# Patient Record
Sex: MALE | Race: WHITE | NOT HISPANIC OR LATINO | Employment: OTHER | URBAN - NONMETROPOLITAN AREA
[De-identification: names, ages, dates, MRNs, and addresses within clinical notes are randomized per-mention and may not be internally consistent; named-entity substitution may affect disease eponyms.]

---

## 2017-05-26 ENCOUNTER — OFFICE VISIT (OUTPATIENT)
Dept: FAMILY MEDICINE CLINIC | Facility: CLINIC | Age: 37
End: 2017-05-26

## 2017-05-26 VITALS
TEMPERATURE: 98.5 F | DIASTOLIC BLOOD PRESSURE: 85 MMHG | WEIGHT: 188 LBS | HEIGHT: 69 IN | BODY MASS INDEX: 27.85 KG/M2 | OXYGEN SATURATION: 98 % | SYSTOLIC BLOOD PRESSURE: 140 MMHG | HEART RATE: 74 BPM

## 2017-05-26 DIAGNOSIS — H60.312 ACUTE DIFFUSE OTITIS EXTERNA OF LEFT EAR: Primary | ICD-10-CM

## 2017-05-26 PROCEDURE — 99213 OFFICE O/P EST LOW 20 MIN: CPT | Performed by: PHYSICIAN ASSISTANT

## 2017-05-26 RX ORDER — OFLOXACIN 3 MG/ML
10 SOLUTION/ DROPS OPHTHALMIC SEE ADMIN INSTRUCTIONS
Qty: 10 ML | Refills: 0 | Status: SHIPPED | OUTPATIENT
Start: 2017-05-26 | End: 2018-05-17

## 2017-05-26 RX ORDER — AZITHROMYCIN 250 MG/1
TABLET, FILM COATED ORAL
Qty: 6 TABLET | Refills: 0 | Status: SHIPPED | OUTPATIENT
Start: 2017-05-26 | End: 2018-04-09 | Stop reason: SDUPTHER

## 2017-05-30 DIAGNOSIS — R56.9 SEIZURE (HCC): ICD-10-CM

## 2017-06-05 RX ORDER — LOSARTAN POTASSIUM AND HYDROCHLOROTHIAZIDE 25; 100 MG/1; MG/1
1 TABLET ORAL DAILY
Qty: 30 TABLET | Refills: 0
Start: 2017-06-05 | End: 2017-06-30 | Stop reason: SDUPTHER

## 2017-06-30 DIAGNOSIS — R56.9 SEIZURE (HCC): ICD-10-CM

## 2017-07-02 RX ORDER — LOSARTAN POTASSIUM AND HYDROCHLOROTHIAZIDE 25; 100 MG/1; MG/1
TABLET ORAL
Qty: 30 TABLET | Refills: 0
Start: 2017-07-02 | End: 2017-08-31 | Stop reason: SDUPTHER

## 2017-08-31 DIAGNOSIS — R56.9 SEIZURE (HCC): ICD-10-CM

## 2017-08-31 RX ORDER — LOSARTAN POTASSIUM AND HYDROCHLOROTHIAZIDE 25; 100 MG/1; MG/1
TABLET ORAL
Qty: 30 TABLET | Refills: 0 | OUTPATIENT
Start: 2017-08-31

## 2017-09-01 DIAGNOSIS — R56.9 SEIZURE (HCC): ICD-10-CM

## 2017-09-01 RX ORDER — LOSARTAN POTASSIUM AND HYDROCHLOROTHIAZIDE 25; 100 MG/1; MG/1
1 TABLET ORAL DAILY
Qty: 30 TABLET | Refills: 0
Start: 2017-09-01 | End: 2017-09-01 | Stop reason: SDUPTHER

## 2017-09-05 RX ORDER — LOSARTAN POTASSIUM AND HYDROCHLOROTHIAZIDE 25; 100 MG/1; MG/1
TABLET ORAL
Qty: 30 TABLET | Refills: 0 | Status: SHIPPED | OUTPATIENT
Start: 2017-09-05 | End: 2017-09-29 | Stop reason: SDUPTHER

## 2017-09-29 DIAGNOSIS — R56.9 SEIZURE (HCC): ICD-10-CM

## 2017-10-02 RX ORDER — LOSARTAN POTASSIUM AND HYDROCHLOROTHIAZIDE 25; 100 MG/1; MG/1
TABLET ORAL
Qty: 30 TABLET | Refills: 0 | Status: SHIPPED | OUTPATIENT
Start: 2017-10-02 | End: 2017-11-01 | Stop reason: SDUPTHER

## 2017-11-01 DIAGNOSIS — R56.9 SEIZURE (HCC): ICD-10-CM

## 2017-11-02 RX ORDER — LOSARTAN POTASSIUM AND HYDROCHLOROTHIAZIDE 25; 100 MG/1; MG/1
TABLET ORAL
Qty: 30 TABLET | Refills: 0 | Status: SHIPPED | OUTPATIENT
Start: 2017-11-02 | End: 2017-11-28 | Stop reason: SDUPTHER

## 2017-11-28 DIAGNOSIS — R56.9 SEIZURE (HCC): ICD-10-CM

## 2017-12-01 RX ORDER — LOSARTAN POTASSIUM AND HYDROCHLOROTHIAZIDE 25; 100 MG/1; MG/1
TABLET ORAL
Qty: 30 TABLET | Refills: 0 | Status: SHIPPED | OUTPATIENT
Start: 2017-12-01 | End: 2018-05-17 | Stop reason: SDUPTHER

## 2018-04-09 ENCOUNTER — OFFICE VISIT (OUTPATIENT)
Dept: FAMILY MEDICINE CLINIC | Facility: CLINIC | Age: 38
End: 2018-04-09

## 2018-04-09 VITALS
HEART RATE: 72 BPM | HEIGHT: 69 IN | WEIGHT: 190 LBS | OXYGEN SATURATION: 97 % | TEMPERATURE: 98.1 F | DIASTOLIC BLOOD PRESSURE: 90 MMHG | SYSTOLIC BLOOD PRESSURE: 150 MMHG | BODY MASS INDEX: 28.14 KG/M2

## 2018-04-09 DIAGNOSIS — H60.312 ACUTE DIFFUSE OTITIS EXTERNA OF LEFT EAR: ICD-10-CM

## 2018-04-09 DIAGNOSIS — R56.9 SEIZURE (HCC): ICD-10-CM

## 2018-04-09 DIAGNOSIS — R30.0 DYSURIA: Primary | ICD-10-CM

## 2018-04-09 DIAGNOSIS — F41.9 ANXIETY: ICD-10-CM

## 2018-04-09 DIAGNOSIS — K59.04 CHRONIC IDIOPATHIC CONSTIPATION: ICD-10-CM

## 2018-04-09 LAB
BILIRUB BLD-MCNC: ABNORMAL MG/DL
CLARITY, POC: ABNORMAL
COLOR UR: ABNORMAL
GLUCOSE UR STRIP-MCNC: NEGATIVE MG/DL
KETONES UR QL: NEGATIVE
LEUKOCYTE EST, POC: NEGATIVE
NITRITE UR-MCNC: NEGATIVE MG/ML
PH UR: 6.5 [PH] (ref 5–8)
PROT UR STRIP-MCNC: NEGATIVE MG/DL
RBC # UR STRIP: NEGATIVE /UL
SP GR UR: 1.02 (ref 1–1.03)
UROBILINOGEN UR QL: NORMAL

## 2018-04-09 PROCEDURE — 99214 OFFICE O/P EST MOD 30 MIN: CPT | Performed by: NURSE PRACTITIONER

## 2018-04-09 RX ORDER — DOCUSATE SODIUM 100 MG/1
100 CAPSULE, LIQUID FILLED ORAL 2 TIMES DAILY
Qty: 60 CAPSULE | Refills: 5 | Status: SHIPPED | OUTPATIENT
Start: 2018-04-09 | End: 2018-05-17 | Stop reason: SDUPTHER

## 2018-04-09 RX ORDER — AZITHROMYCIN 250 MG/1
TABLET, FILM COATED ORAL
Qty: 6 TABLET | Refills: 0 | Status: SHIPPED | OUTPATIENT
Start: 2018-04-09 | End: 2018-05-17

## 2018-04-09 NOTE — PROGRESS NOTES
Subjective   Alberto Mcclelland is a 37 y.o. male.     Chief Complaint   Patient presents with   • Difficulty Urinating       History of Present Illness       Alberto here today being seen for acute complaint of dysuria.  Dysuria over the past few days.  He does drink several bottles of water and flavored water each day.  Urine is dark in the mornings when he gets up.  Some frequency for the past year which is not of new onset.    Seizure disorder-he is under the care of Dr. Lyon in [facility].    Essential hypertension-patient currently receives Hyzaar.  He reports that his neurology office wrote him this medication with 5 refills at his last visit.  He does report that he takes this daily as ordered.    New complaint of constipation-patient reports that he has periods of constipation and at times does take an over-the-counter laxative.  His stool is hard and he must strain which at times causes some tearing.  He has had a colonoscopy a few years ago per patient which was normal.    Anxiety - stable           The following portions of the patient's history were reviewed and updated as appropriate: allergies, current medications, past family history, past medical history, past social history, past surgical history and problem list.    Review of Systems   Constitutional: Negative.    HENT: Negative.    Respiratory: Negative for cough and chest tightness.    Cardiovascular: Negative for chest pain.   Gastrointestinal: Positive for constipation and nausea (due to routine medication ). Negative for abdominal pain and diarrhea.   Genitourinary: Positive for dysuria.   Musculoskeletal: Positive for arthralgias and back pain.   Skin: Negative.    Allergic/Immunologic: Positive for environmental allergies and food allergies.   Neurological: Positive for seizures (most recent last wk ). Negative for dizziness and headaches.   Psychiatric/Behavioral: Negative for dysphoric mood, self-injury and suicidal ideas. The patient is  "nervous/anxious.        Objective     /90   Pulse 72   Temp 98.1 °F (36.7 °C) (Temporal Artery )   Ht 175.3 cm (69\")   Wt 86.2 kg (190 lb)   SpO2 97%   BMI 28.06 kg/m²   Lab on 09/01/2016   Component Date Value Ref Range Status   • Ammonia 09/01/2016 32  16 - 60 umol/L Final       Physical Exam   Constitutional: He is oriented to person, place, and time. He appears well-developed and well-nourished. No distress.   HENT:   Head: Normocephalic.   Right Ear: External ear normal.   Left Ear: External ear normal.   Nose: Nose normal.   Mouth/Throat: Oropharynx is clear and moist.   Eyes: Pupils are equal, round, and reactive to light.   Neck: Normal range of motion. Neck supple. No tracheal deviation present. No thyromegaly present.   Cardiovascular: Normal rate, regular rhythm and normal heart sounds.  Exam reveals no gallop and no friction rub.    No murmur heard.  Pulmonary/Chest: Effort normal and breath sounds normal. No respiratory distress. He has no wheezes. He has no rales. He exhibits no tenderness.   Abdominal: Soft. Bowel sounds are normal. He exhibits no distension and no mass. There is no tenderness. There is no rebound and no guarding.   Musculoskeletal: Normal range of motion.   Neurological: He is alert and oriented to person, place, and time. He has normal reflexes.   CN 2-12 grossly intact    Skin: Skin is warm and dry. No rash noted. He is not diaphoretic. No erythema.   Psychiatric: He has a normal mood and affect. His speech is normal and behavior is normal. Judgment and thought content normal. Cognition and memory are normal.   Vitals reviewed.      Assessment/Plan     Problem List Items Addressed This Visit        Digestive    Chronic idiopathic constipation    Relevant Medications    docusate sodium (COLACE) 100 MG capsule       Nervous and Auditory    Seizure    Overview     Head injury at age 4. Is under the care of neurology.          Relevant Orders    CBC & Differential    " Comprehensive Metabolic Panel    TSH    Vitamin D 25 Hydroxy    Lipid Panel    PSA Screen    Vitamin B12       Other    Anxiety    Overview     Stable at this time         Relevant Orders    CBC & Differential    Comprehensive Metabolic Panel    TSH    Vitamin D 25 Hydroxy    Lipid Panel    PSA Screen    Vitamin B12      Other Visit Diagnoses     Dysuria    -  Primary    Relevant Medications    azithromycin (ZITHROMAX) 250 MG tablet    Other Relevant Orders    POCT urinalysis dipstick, automated (Completed)    Urinalysis With / Culture If Indicated - Urine, Clean Catch    CBC & Differential    Comprehensive Metabolic Panel    TSH    Vitamin D 25 Hydroxy    Lipid Panel    PSA Screen    Vitamin B12    Acute diffuse otitis externa of left ear        Relevant Orders    CBC & Differential    Comprehensive Metabolic Panel    TSH    Vitamin D 25 Hydroxy    Lipid Panel    PSA Screen    Vitamin B12        UA is normal.  We will send for culture and micro-urinalysis.   Start Zpack due to symptomatic.   New dx constipation, ensure adequate water intake and start colace .    Emotional support and active listening provided.   I have discussed diagnosis in detail today allowing time for questions and answers. Pt is aware of reasons to seek urgent or emergent medical care as well as reasons to return to the clinic for evaluation. Possible side effects, interactions and progression of symptoms discussed as well. Pt / family states understanding.   Fasting labs this week.   Remain under the care of neurology, seizure precautions discussed.   Follow up in 4-6 weeks, sooner if needed.     Errors in dictation may reflect use of voice recognition software and not all errors in transcription may have been detected prior to signing.                  This document has been electronically signed by:  RAJNI Rubalcava, NP-C

## 2018-04-16 ENCOUNTER — LAB (OUTPATIENT)
Dept: FAMILY MEDICINE CLINIC | Facility: CLINIC | Age: 38
End: 2018-04-16

## 2018-04-16 DIAGNOSIS — F41.9 ANXIETY: ICD-10-CM

## 2018-04-16 DIAGNOSIS — R30.0 DYSURIA: ICD-10-CM

## 2018-04-16 DIAGNOSIS — H60.312 ACUTE DIFFUSE OTITIS EXTERNA OF LEFT EAR: ICD-10-CM

## 2018-04-16 DIAGNOSIS — R56.9 SEIZURE (HCC): ICD-10-CM

## 2018-04-16 LAB
25(OH)D3 SERPL-MCNC: 23 NG/ML
ALBUMIN SERPL-MCNC: 4.4 G/DL (ref 3.5–5)
ALBUMIN/GLOB SERPL: 1.4 G/DL (ref 1.5–2.5)
ALP SERPL-CCNC: 72 U/L (ref 40–129)
ALT SERPL W P-5'-P-CCNC: 83 U/L (ref 10–44)
ANION GAP SERPL CALCULATED.3IONS-SCNC: 6.9 MMOL/L (ref 3.6–11.2)
AST SERPL-CCNC: 41 U/L (ref 10–34)
BACTERIA UR QL AUTO: ABNORMAL /HPF
BASOPHILS # BLD AUTO: 0.02 10*3/MM3 (ref 0–0.3)
BASOPHILS NFR BLD AUTO: 0.5 % (ref 0–2)
BILIRUB SERPL-MCNC: 0.4 MG/DL (ref 0.2–1.8)
BILIRUB UR QL STRIP: ABNORMAL
BUN BLD-MCNC: 12 MG/DL (ref 7–21)
BUN/CREAT SERPL: 14.3 (ref 7–25)
CALCIUM SPEC-SCNC: 9.3 MG/DL (ref 7.7–10)
CHLORIDE SERPL-SCNC: 104 MMOL/L (ref 99–112)
CHOLEST SERPL-MCNC: 176 MG/DL (ref 0–200)
CLARITY UR: ABNORMAL
CO2 SERPL-SCNC: 28.1 MMOL/L (ref 24.3–31.9)
COLOR UR: ABNORMAL
CREAT BLD-MCNC: 0.84 MG/DL (ref 0.43–1.29)
DEPRECATED RDW RBC AUTO: 43.1 FL (ref 37–54)
EOSINOPHIL # BLD AUTO: 0.17 10*3/MM3 (ref 0–0.7)
EOSINOPHIL NFR BLD AUTO: 4 % (ref 0–5)
ERYTHROCYTE [DISTWIDTH] IN BLOOD BY AUTOMATED COUNT: 13.5 % (ref 11.5–14.5)
GFR SERPL CREATININE-BSD FRML MDRD: 102 ML/MIN/1.73
GLOBULIN UR ELPH-MCNC: 3.1 GM/DL
GLUCOSE BLD-MCNC: 91 MG/DL (ref 70–110)
GLUCOSE UR STRIP-MCNC: NEGATIVE MG/DL
HCT VFR BLD AUTO: 45.1 % (ref 42–52)
HDLC SERPL-MCNC: 54 MG/DL (ref 60–100)
HGB BLD-MCNC: 15.4 G/DL (ref 14–18)
HGB UR QL STRIP.AUTO: NEGATIVE
HYALINE CASTS UR QL AUTO: ABNORMAL /LPF
IMM GRANULOCYTES # BLD: 0.01 10*3/MM3 (ref 0–0.03)
IMM GRANULOCYTES NFR BLD: 0.2 % (ref 0–0.5)
KETONES UR QL STRIP: ABNORMAL
LDLC SERPL CALC-MCNC: 95 MG/DL (ref 0–100)
LDLC/HDLC SERPL: 1.76 {RATIO}
LEUKOCYTE ESTERASE UR QL STRIP.AUTO: ABNORMAL
LYMPHOCYTES # BLD AUTO: 1.38 10*3/MM3 (ref 1–3)
LYMPHOCYTES NFR BLD AUTO: 32.9 % (ref 21–51)
MCH RBC QN AUTO: 30.1 PG (ref 27–33)
MCHC RBC AUTO-ENTMCNC: 34.1 G/DL (ref 33–37)
MCV RBC AUTO: 88.1 FL (ref 80–94)
MONOCYTES # BLD AUTO: 0.42 10*3/MM3 (ref 0.1–0.9)
MONOCYTES NFR BLD AUTO: 10 % (ref 0–10)
NEUTROPHILS # BLD AUTO: 2.2 10*3/MM3 (ref 1.4–6.5)
NEUTROPHILS NFR BLD AUTO: 52.4 % (ref 30–70)
NITRITE UR QL STRIP: POSITIVE
OSMOLALITY SERPL CALC.SUM OF ELEC: 276.9 MOSM/KG (ref 273–305)
PH UR STRIP.AUTO: <=5 [PH] (ref 5–8)
PLATELET # BLD AUTO: 208 10*3/MM3 (ref 130–400)
PMV BLD AUTO: 11.6 FL (ref 6–10)
POTASSIUM BLD-SCNC: 4 MMOL/L (ref 3.5–5.3)
PROT SERPL-MCNC: 7.5 G/DL (ref 6–8)
PROT UR QL STRIP: ABNORMAL
PSA SERPL-MCNC: 0.22 NG/ML (ref 0–4)
RBC # BLD AUTO: 5.12 10*6/MM3 (ref 4.7–6.1)
RBC # UR: ABNORMAL /HPF
REF LAB TEST METHOD: ABNORMAL
SODIUM BLD-SCNC: 139 MMOL/L (ref 135–153)
SP GR UR STRIP: >1.03 (ref 1–1.03)
SQUAMOUS #/AREA URNS HPF: ABNORMAL /HPF
TRIGL SERPL-MCNC: 135 MG/DL (ref 0–150)
TSH SERPL DL<=0.05 MIU/L-ACNC: 1.01 MIU/ML (ref 0.55–4.78)
UROBILINOGEN UR QL STRIP: ABNORMAL
VIT B12 BLD-MCNC: 504 PG/ML (ref 211–911)
VLDLC SERPL-MCNC: 27 MG/DL
WBC NRBC COR # BLD: 4.2 10*3/MM3 (ref 4.5–12.5)
WBC UR QL AUTO: ABNORMAL /HPF

## 2018-04-16 PROCEDURE — 87086 URINE CULTURE/COLONY COUNT: CPT | Performed by: NURSE PRACTITIONER

## 2018-04-16 PROCEDURE — G0103 PSA SCREENING: HCPCS | Performed by: NURSE PRACTITIONER

## 2018-04-16 PROCEDURE — 81001 URINALYSIS AUTO W/SCOPE: CPT | Performed by: NURSE PRACTITIONER

## 2018-04-16 PROCEDURE — 82607 VITAMIN B-12: CPT | Performed by: NURSE PRACTITIONER

## 2018-04-16 PROCEDURE — 80061 LIPID PANEL: CPT | Performed by: NURSE PRACTITIONER

## 2018-04-16 PROCEDURE — 36415 COLL VENOUS BLD VENIPUNCTURE: CPT

## 2018-04-16 PROCEDURE — 82306 VITAMIN D 25 HYDROXY: CPT | Performed by: NURSE PRACTITIONER

## 2018-04-16 PROCEDURE — 80050 GENERAL HEALTH PANEL: CPT | Performed by: NURSE PRACTITIONER

## 2018-04-17 ENCOUNTER — TELEPHONE (OUTPATIENT)
Dept: FAMILY MEDICINE CLINIC | Facility: CLINIC | Age: 38
End: 2018-04-17

## 2018-04-17 DIAGNOSIS — K59.09 CHRONIC CONSTIPATION: Primary | ICD-10-CM

## 2018-04-17 NOTE — TELEPHONE ENCOUNTER
----- Message from RAJNI Moya sent at 4/17/2018  2:59 PM EDT -----  Please send a copy of results to pt's neurology provider and set him up with GI for consult.     I spoke with jovanni and he is very concerned about his kidney test is there anything I can tell him   99

## 2018-04-18 LAB — BACTERIA SPEC AEROBE CULT: NORMAL

## 2018-04-18 RX ORDER — CIPROFLOXACIN 500 MG/1
500 TABLET, FILM COATED ORAL 2 TIMES DAILY
Qty: 14 TABLET | Refills: 0 | Status: SHIPPED | OUTPATIENT
Start: 2018-04-18 | End: 2018-04-25

## 2018-05-17 ENCOUNTER — OFFICE VISIT (OUTPATIENT)
Dept: FAMILY MEDICINE CLINIC | Facility: CLINIC | Age: 38
End: 2018-05-17

## 2018-05-17 VITALS
SYSTOLIC BLOOD PRESSURE: 130 MMHG | WEIGHT: 190 LBS | OXYGEN SATURATION: 98 % | DIASTOLIC BLOOD PRESSURE: 80 MMHG | TEMPERATURE: 97.8 F | HEART RATE: 62 BPM | HEIGHT: 69 IN | BODY MASS INDEX: 28.14 KG/M2

## 2018-05-17 DIAGNOSIS — N39.0 RECURRENT UTI: ICD-10-CM

## 2018-05-17 DIAGNOSIS — R56.9 SEIZURE (HCC): ICD-10-CM

## 2018-05-17 DIAGNOSIS — K59.04 CHRONIC IDIOPATHIC CONSTIPATION: ICD-10-CM

## 2018-05-17 DIAGNOSIS — R30.0 DYSURIA: Primary | ICD-10-CM

## 2018-05-17 DIAGNOSIS — E55.9 VITAMIN D DEFICIENCY: ICD-10-CM

## 2018-05-17 LAB
BILIRUB BLD-MCNC: ABNORMAL MG/DL
BILIRUB BLD-MCNC: NORMAL MG/DL
CLARITY, POC: ABNORMAL
CLARITY, POC: NORMAL
COLOR UR: ABNORMAL
COLOR UR: NORMAL
GLUCOSE UR STRIP-MCNC: NEGATIVE MG/DL
GLUCOSE UR STRIP-MCNC: NORMAL MG/DL
KETONES UR QL: NEGATIVE
KETONES UR QL: NORMAL
LEUKOCYTE EST, POC: NEGATIVE
LEUKOCYTE EST, POC: NORMAL
NITRITE UR-MCNC: NEGATIVE MG/ML
NITRITE UR-MCNC: NORMAL MG/ML
PH UR: 5.5 [PH] (ref 5–8)
PH UR: NORMAL [PH] (ref 5–8)
PROT UR STRIP-MCNC: NEGATIVE MG/DL
PROT UR STRIP-MCNC: NORMAL MG/DL
RBC # UR STRIP: NEGATIVE /UL
RBC # UR STRIP: NORMAL /UL
SP GR UR: 1.03 (ref 1–1.03)
SP GR UR: NORMAL (ref 1–1.03)
UROBILINOGEN UR QL: NORMAL
UROBILINOGEN UR QL: NORMAL

## 2018-05-17 PROCEDURE — 99214 OFFICE O/P EST MOD 30 MIN: CPT | Performed by: NURSE PRACTITIONER

## 2018-05-17 RX ORDER — DOCUSATE SODIUM 100 MG/1
100 CAPSULE, LIQUID FILLED ORAL 2 TIMES DAILY
Qty: 60 CAPSULE | Refills: 5 | Status: SHIPPED | OUTPATIENT
Start: 2018-05-17 | End: 2022-01-19

## 2018-05-17 RX ORDER — LOSARTAN POTASSIUM AND HYDROCHLOROTHIAZIDE 25; 100 MG/1; MG/1
1 TABLET ORAL DAILY
Qty: 30 TABLET | Refills: 5 | Status: SHIPPED | OUTPATIENT
Start: 2018-05-17 | End: 2018-12-29 | Stop reason: SDUPTHER

## 2018-05-17 NOTE — PROGRESS NOTES
Subjective   Alberto Mcclelland is a 37 y.o. male.     Chief Complaint   Patient presents with   • Follow-up     go over labs       History of Present Illness       Seizure disorder - under the care of Dr. Lyon for neurology. Has visits every six months . Most recent seizure has been several days.       Hypertension - stable with current medication. Denies any side effects or symptoms of complications.     Constipation - improved with diet and use of colace twice daily. No rectal bleeding . Did not keep his GI consult appointment.     Elevated liver enzymes - mildly elevated. Being monitored by neuro.     New dx vit d def on today's review of labs.       The following portions of the patient's history were reviewed and updated as appropriate: allergies, current medications, past family history, past medical history, past social history, past surgical history and problem list.    Review of Systems   Constitutional: Negative for appetite change, fatigue and unexpected weight change.   HENT: Negative for congestion, ear pain, nosebleeds, postnasal drip, rhinorrhea, sinus pain, sinus pressure, sore throat, trouble swallowing and voice change.    Eyes: Negative for pain and visual disturbance.   Respiratory: Negative for cough, shortness of breath and wheezing.    Cardiovascular: Negative for chest pain, palpitations and leg swelling.   Gastrointestinal: Positive for constipation. Negative for abdominal pain, blood in stool and diarrhea.   Endocrine: Negative for cold intolerance and polydipsia.   Genitourinary: Positive for dysuria. Negative for difficulty urinating, flank pain and hematuria.   Musculoskeletal: Negative for arthralgias, back pain, gait problem, joint swelling, myalgias and neck pain.   Skin: Negative for color change and rash.   Allergic/Immunologic: Positive for environmental allergies.   Neurological: Positive for seizures. Negative for dizziness, syncope, speech difficulty, weakness, numbness and  "headaches.   Hematological: Negative.    Psychiatric/Behavioral: Negative for sleep disturbance and suicidal ideas.   All other systems reviewed and are negative.      Objective     /80   Pulse 62   Temp 97.8 °F (36.6 °C) (Tympanic)   Ht 175.3 cm (69.02\")   Wt 86.2 kg (190 lb)   SpO2 98%   BMI 28.05 kg/m²   Lab on 04/16/2018   Component Date Value Ref Range Status   • Color, UA 04/16/2018 Orange* Yellow, Straw Final   • Appearance, UA 04/16/2018 Turbid* Clear Final   • pH, UA 04/16/2018 <=5.0  5.0 - 8.0 Final   • Specific Gravity, UA 04/16/2018 >1.030* 1.005 - 1.030 Final   • Glucose, UA 04/16/2018 Negative  Negative Final   • Ketones, UA 04/16/2018 Trace* Negative Final   • Bilirubin, UA 04/16/2018 Small (1+)* Negative Final   • Blood, UA 04/16/2018 Negative  Negative Final   • Protein, UA 04/16/2018 30 mg/dL (1+)* Negative Final   • Leuk Esterase, UA 04/16/2018 Small (1+)* Negative Final   • Nitrite, UA 04/16/2018 Positive* Negative Final   • Urobilinogen, UA 04/16/2018 0.2 E.U./dL  0.2 - 1.0 E.U./dL Final   • Glucose 04/16/2018 91  70 - 110 mg/dL Final   • BUN 04/16/2018 12  7 - 21 mg/dL Final   • Creatinine 04/16/2018 0.84  0.43 - 1.29 mg/dL Final   • Sodium 04/16/2018 139  135 - 153 mmol/L Final   • Potassium 04/16/2018 4.0  3.5 - 5.3 mmol/L Final   • Chloride 04/16/2018 104  99 - 112 mmol/L Final   • CO2 04/16/2018 28.1  24.3 - 31.9 mmol/L Final   • Calcium 04/16/2018 9.3  7.7 - 10.0 mg/dL Final   • Total Protein 04/16/2018 7.5  6.0 - 8.0 g/dL Final   • Albumin 04/16/2018 4.40  3.50 - 5.00 g/dL Final   • ALT (SGPT) 04/16/2018 83* 10 - 44 U/L Final   • AST (SGOT) 04/16/2018 41* 10 - 34 U/L Final   • Alkaline Phosphatase 04/16/2018 72  40 - 129 U/L Final   • Total Bilirubin 04/16/2018 0.4  0.2 - 1.8 mg/dL Final   • eGFR Non African Amer 04/16/2018 102  >60 mL/min/1.73 Final   • Globulin 04/16/2018 3.1  gm/dL Final   • A/G Ratio 04/16/2018 1.4* 1.5 - 2.5 g/dL Final   • BUN/Creatinine Ratio 04/16/2018 " 14.3  7.0 - 25.0 Final   • Anion Gap 04/16/2018 6.9  3.6 - 11.2 mmol/L Final   • TSH 04/16/2018 1.009  0.550 - 4.780 mIU/mL Final   • 25 Hydroxy, Vitamin D 04/16/2018 23.0  ng/ml Final   • Total Cholesterol 04/16/2018 176  0 - 200 mg/dL Final   • Triglycerides 04/16/2018 135  0 - 150 mg/dL Final   • HDL Cholesterol 04/16/2018 54* 60 - 100 mg/dL Final   • LDL Cholesterol  04/16/2018 95  0 - 100 mg/dL Final   • VLDL Cholesterol 04/16/2018 27  mg/dL Final   • LDL/HDL Ratio 04/16/2018 1.76   Final   • PSA 04/16/2018 0.220  0.000 - 4.000 ng/mL Final   • Vitamin B-12 04/16/2018 504  211 - 911 pg/mL Final   • WBC 04/16/2018 4.20* 4.50 - 12.50 10*3/mm3 Final   • RBC 04/16/2018 5.12  4.70 - 6.10 10*6/mm3 Final   • Hemoglobin 04/16/2018 15.4  14.0 - 18.0 g/dL Final   • Hematocrit 04/16/2018 45.1  42.0 - 52.0 % Final   • MCV 04/16/2018 88.1  80.0 - 94.0 fL Final   • MCH 04/16/2018 30.1  27.0 - 33.0 pg Final   • MCHC 04/16/2018 34.1  33.0 - 37.0 g/dL Final   • RDW 04/16/2018 13.5  11.5 - 14.5 % Final   • RDW-SD 04/16/2018 43.1  37.0 - 54.0 fl Final   • MPV 04/16/2018 11.6* 6.0 - 10.0 fL Final   • Platelets 04/16/2018 208  130 - 400 10*3/mm3 Final   • Neutrophil % 04/16/2018 52.4  30.0 - 70.0 % Final   • Lymphocyte % 04/16/2018 32.9  21.0 - 51.0 % Final   • Monocyte % 04/16/2018 10.0  0.0 - 10.0 % Final   • Eosinophil % 04/16/2018 4.0  0.0 - 5.0 % Final   • Basophil % 04/16/2018 0.5  0.0 - 2.0 % Final   • Immature Grans % 04/16/2018 0.2  0.0 - 0.5 % Final   • Neutrophils, Absolute 04/16/2018 2.20  1.40 - 6.50 10*3/mm3 Final   • Lymphocytes, Absolute 04/16/2018 1.38  1.00 - 3.00 10*3/mm3 Final   • Monocytes, Absolute 04/16/2018 0.42  0.10 - 0.90 10*3/mm3 Final   • Eosinophils, Absolute 04/16/2018 0.17  0.00 - 0.70 10*3/mm3 Final   • Basophils, Absolute 04/16/2018 0.02  0.00 - 0.30 10*3/mm3 Final   • Immature Grans, Absolute 04/16/2018 0.01  0.00 - 0.03 10*3/mm3 Final   • RBC, UA 04/16/2018 0-2  None Seen, 0-2 /HPF Final   • WBC,  UA 04/16/2018 3-6* None Seen, 0-2 /HPF Final   • Bacteria, UA 04/16/2018 4+* None Seen /HPF Final   • Squamous Epithelial Cells, UA 04/16/2018 0-2  None Seen, 0-2 /HPF Final   • Hyaline Casts, UA 04/16/2018 None Seen  None Seen /LPF Final   • Methodology 04/16/2018 Manual Light Microscopy   Final   • Urine Culture 04/16/2018 10,000-20,000 CFU/mL Normal Urogenital Nat   Final   • Osmolality Calc 04/16/2018 276.9  273.0 - 305.0 mOsm/kg Final       Physical Exam   Constitutional: He is oriented to person, place, and time. He appears well-developed and well-nourished. No distress.   HENT:   Head: Normocephalic.   Right Ear: External ear normal.   Left Ear: External ear normal.   Nose: Nose normal.   Mouth/Throat: Oropharynx is clear and moist. No oropharyngeal exudate.   Eyes: Conjunctivae are normal. Pupils are equal, round, and reactive to light.   Neck: Normal range of motion. Neck supple. No tracheal deviation present. No thyromegaly present.   Cardiovascular: Normal rate, regular rhythm and normal heart sounds.  Exam reveals no gallop and no friction rub.    No murmur heard.  Pulmonary/Chest: Effort normal and breath sounds normal. No respiratory distress. He has no wheezes. He has no rales. He exhibits no tenderness.   Abdominal: Soft. Bowel sounds are normal. He exhibits no distension and no mass. There is no tenderness. There is no rebound and no guarding.   Musculoskeletal: Normal range of motion.   Lymphadenopathy:     He has no cervical adenopathy.   Neurological: He is alert and oriented to person, place, and time. He has normal reflexes.   CN 2-12 grossly intact    Skin: Skin is warm and dry. Capillary refill takes less than 2 seconds. No rash noted. He is not diaphoretic. No erythema.   Psychiatric: He has a normal mood and affect. His behavior is normal. Judgment and thought content normal.   Vitals reviewed.      Assessment/Plan     Problem List Items Addressed This Visit        Digestive    Chronic  idiopathic constipation    Relevant Medications    docusate sodium (COLACE) 100 MG capsule       Nervous and Auditory    Seizure    Overview     Head injury at age 4. Is under the care of neurology.          Relevant Medications    losartan-hydrochlorothiazide (HYZAAR) 100-25 MG per tablet       Genitourinary    Recurrent UTI      Other Visit Diagnoses     Dysuria    -  Primary    Relevant Orders    POC Urinalysis Dipstick (Completed)    Vitamin D deficiency        Relevant Orders    POC Urinalysis Dipstick, Automated        I have discussed diagnosis in detail today allowing time for questions and answers. Pt is aware of reasons to seek urgent or emergent medical care as well as reasons to return to the clinic for evaluation. Possible side effects, interactions and progression of symptoms discussed as well. Pt / family states understanding.   Emotional support and active listening provided.   Reviewed disease process, possible complications, reasons for urgent care and possible side effects of medications. Pt/family state understanding.     UA is normal today. Continue to drink adequate water and avoid soda.   Start vitamin d supplement and obtain adequate sun exposure as available.    Recent labs reviewed and discussed.   Refill routine medications.   Recommend GI consult be rescheduled, pt declines. Discussed elevated liver enzymes and possible reasons including side effects of seizure medication. Pt states understanding. Neurology is also monitoring liver function.  Seizure precautions discussed.     Follow up 3 - 6 months, sooner if needed.   Routine labs every 3-6 months.        Errors in dictation may reflect use of voice recognition software and not all errors in transcription may have been detected prior to signing.                This document has been electronically signed by:  RAJNI Rubalcava, NP-C

## 2018-12-29 DIAGNOSIS — R56.9 SEIZURE (HCC): ICD-10-CM

## 2018-12-31 RX ORDER — LOSARTAN POTASSIUM AND HYDROCHLOROTHIAZIDE 25; 100 MG/1; MG/1
1 TABLET ORAL DAILY
Qty: 30 TABLET | Refills: 5 | Status: SHIPPED | OUTPATIENT
Start: 2018-12-31 | End: 2022-01-19

## 2022-01-19 ENCOUNTER — OFFICE VISIT (OUTPATIENT)
Dept: FAMILY MEDICINE CLINIC | Facility: CLINIC | Age: 42
End: 2022-01-19

## 2022-01-19 VITALS
DIASTOLIC BLOOD PRESSURE: 100 MMHG | SYSTOLIC BLOOD PRESSURE: 180 MMHG | TEMPERATURE: 97.9 F | HEIGHT: 70 IN | OXYGEN SATURATION: 100 % | WEIGHT: 201 LBS | BODY MASS INDEX: 28.77 KG/M2 | HEART RATE: 104 BPM

## 2022-01-19 DIAGNOSIS — M79.672 BILATERAL FOOT PAIN: ICD-10-CM

## 2022-01-19 DIAGNOSIS — G40.909 SEIZURE DISORDER: Primary | Chronic | ICD-10-CM

## 2022-01-19 DIAGNOSIS — I10 ESSENTIAL HYPERTENSION: Chronic | ICD-10-CM

## 2022-01-19 DIAGNOSIS — M79.671 BILATERAL FOOT PAIN: ICD-10-CM

## 2022-01-19 PROCEDURE — 80061 LIPID PANEL: CPT | Performed by: NURSE PRACTITIONER

## 2022-01-19 PROCEDURE — 84436 ASSAY OF TOTAL THYROXINE: CPT | Performed by: NURSE PRACTITIONER

## 2022-01-19 PROCEDURE — 84479 ASSAY OF THYROID (T3 OR T4): CPT | Performed by: NURSE PRACTITIONER

## 2022-01-19 PROCEDURE — 84550 ASSAY OF BLOOD/URIC ACID: CPT | Performed by: NURSE PRACTITIONER

## 2022-01-19 PROCEDURE — 80050 GENERAL HEALTH PANEL: CPT | Performed by: NURSE PRACTITIONER

## 2022-01-19 PROCEDURE — 99204 OFFICE O/P NEW MOD 45 MIN: CPT | Performed by: NURSE PRACTITIONER

## 2022-01-19 PROCEDURE — 80076 HEPATIC FUNCTION PANEL: CPT | Performed by: NURSE PRACTITIONER

## 2022-01-19 RX ORDER — LAMOTRIGINE 100 MG/1
100 TABLET ORAL EVERY 12 HOURS
COMMUNITY
Start: 2022-01-03

## 2022-01-19 NOTE — PROGRESS NOTES
Chief Complaint  Follow-up    Subjective          Alberto Mcclelland is a 41 y.o. male who presents today to [facility] for follow up and concern about labs.    HPI:   Patient presents today to follow up. He has not been to office for routine check up in several years. Patient has medical diagnosis of seizure disorder, hypertension, arthritis, anxiety, and chronic idopathic constipation.    Seizure disorder- patient states he had a mild seizure this morning once he arrived to our office. Medical assistant was present at the time of the seizure and patient suffered no fall or trauma. Seizure lasted about 5-10 seconds. Patient states he did take his seizure medication this morning, takes as prescribed. He last seen neurology in October of last year and medication adjustments were made. He states their plan is to do an implanted neurostimulator for his seizure disorder. He currently does not drive. Seizures occur about 1-2x per week, last a few seconds.      He states it has been several years since he has had routine labs completed in office and would like to have those drawn today including a uric acid and concerned about his liver function secondary to chronic use of seizure medication. Patient states that he has chronic bilateral foot pain. He states that they feel tight and achy. Gout he reports runs in his family. Describes pain as burning and throbbing, worse at night. Also has chronic bilateral knee pain. Patient denies symptoms are worse with any particular foods or diets. He has never had uric acid level checked. In regards to liver function, no history of IV drug use or alcoholism. Denies any abdominal pain, nausea, or vomiting. No significant weight loss or gain.    History of hypertension- patient states he is currently not taking any hypertensive medications. Blood pressure was 182/100 today after seizure. He states it normally is high like that immediately after a seizure  but then normalizes. He has a blood pressure cuff at home and does check occasionally. He states that it is never that high at home, usually 130s-140s systolic. Denies chest pain or headache.    Objective     Problem List:  Patient Active Problem List   Diagnosis   • Seizure (HCC)   • Essential hypertension   • Arthritis   • Anxiety   • Chronic idiopathic constipation   • Recurrent UTI       Allergy:   Allergies   Allergen Reactions   • Demerol [Meperidine] Rash   • Penicillins Rash        Discontinued Medications:  Medications Discontinued During This Encounter   Medication Reason   • docusate sodium (COLACE) 100 MG capsule *Therapy completed   • Cholecalciferol (PA VITAMIN D-3 GUMMY) 400 units chewable tablet *Therapy completed   • losartan-hydrochlorothiazide (HYZAAR) 100-25 MG per tablet *Therapy completed   • phenytoin (DILANTIN) 100 MG ER capsule *Therapy completed   • divalproex (DEPAKOTE) 500 MG DR tablet    • Lactulose powder *Therapy completed       Current Medications:   Current Outpatient Medications   Medication Sig Dispense Refill   • carBAMazepine XR (TEGretol  XR) 400 MG 12 hr tablet Take two pills twice daily 120 tablet 3   • coenzyme Q10 100 MG capsule Take 1 capsule by mouth 2 (two) times a day. 60 capsule 5   • phenytoin extended (PHENYTEK) 200 MG ER capsule Take 1 capsule by mouth 2 (two) times a day. 60 capsule 5   • lamoTRIgine (LaMICtal) 100 MG tablet Take 100 mg by mouth Every 12 (Twelve) Hours.       No current facility-administered medications for this visit.       Past Medical History:  Past Medical History:   Diagnosis Date   • Anxiety    • Arthritis    • Depression    • Seizure (HCC)        Past Surgical History:  Past Surgical History:   Procedure Laterality Date   • CHOLECYSTECTOMY  2015 dec   • OTHER SURGICAL HISTORY      Plate in L side of skull    • SHOULDER SURGERY         Review of Systems:  Review of Systems   Constitutional: Negative.    HENT: Negative.    Eyes: Negative.   "  Respiratory: Negative.    Cardiovascular: Negative.    Musculoskeletal: Positive for arthralgias.   Skin: Negative.    Neurological: Positive for seizures.   Psychiatric/Behavioral: Negative.    All other systems reviewed and are negative.      Physical Exam:  Physical Exam    Vital Signs:   /100   Pulse 104   Temp 97.9 °F (36.6 °C) (Temporal)   Ht 177.8 cm (70\")   Wt 91.2 kg (201 lb)   SpO2 100%   BMI 28.84 kg/m²      Lab Results:   No visits with results within 6 Month(s) from this visit.   Latest known visit with results is:   Office Visit on 05/17/2018   Component Date Value Ref Range Status   • Color 05/17/2018 Straw  Yellow, Straw, Dark Yellow, Nella Final   • Clarity, UA 05/17/2018 Turbid* Clear Final   • Glucose, UA 05/17/2018 Negative  Negative, 1000 mg/dL (3+) mg/dL Final   • Bilirubin 05/17/2018 Small (1+)* Negative Final   • Ketones, UA 05/17/2018 Negative  Negative Final   • Specific Gravity  05/17/2018 1.030  1.005 - 1.030 Final   • Blood, UA 05/17/2018 Negative  Negative Final   • pH, Urine 05/17/2018 5.5  5.0 - 8.0 Final   • Protein, POC 05/17/2018 Negative  Negative mg/dL Final   • Urobilinogen, UA 05/17/2018 Normal  Normal Final   • Leukocytes 05/17/2018 Negative  Negative Final   • Nitrite, UA 05/17/2018 Negative  Negative Final       EKG Results:  No orders to display       Imaging Results:  No Images in the past 120 days found..              Assessment and Plan   Diagnoses and all orders for this visit:    1. Seizure disorder (HCC) (Primary)  Comments:  continue taking epilepsy medication as prescribed by neurology  will perform routine labs today  Orders:  -     CBC w AUTO Differential  -     Comprehensive metabolic panel  -     Lipid panel  -     Uric acid  -     Hepatic Function Panel  -     Thyroid Panel With TSH    2. Essential hypertension  Comments:  elevated blood pressure reading today with little change noted after rechecking in 30 minutes  Will need to start lisinopril " 10 mg if no improvement after 1 week  patient instructed to check blood pressure again once he is home today and notify me of any readings > 140 sbp.   Patient is to record blood pressure readings daily and I will follow up with him in 1 week  3. Bilateral foot pain   -uric acid level today      Meds ordered during this visit:  No orders of the defined types were placed in this encounter.      Patient Instructions:  Patient instructions given for the following visit diagnosis:    ICD-10-CM ICD-9-CM   1. Seizure disorder (HCC)  G40.909 345.90   2. Essential hypertension  I10 401.9   3. Bilateral foot pain  M79.671 729.5    M79.672        Follow Up   Recheck BP in 1 week  Return in about 3 months (around 4/19/2022).        This document has been electronically signed by RAJNI Goldsmith  January 19, 2022 11:41 EST    Patient was given instructions and counseling regarding his condition or for health maintenance advice. Please see specific information pulled into the AVS if appropriate.     Part of this note may be an electronic transcription/translation of spoken language to printed text using the Dragon Dictation System.

## 2022-01-20 LAB
ALBUMIN SERPL-MCNC: 4.8 G/DL (ref 3.5–5.2)
ALBUMIN SERPL-MCNC: 4.9 G/DL (ref 3.5–5.2)
ALBUMIN/GLOB SERPL: 2.1 G/DL
ALP SERPL-CCNC: 51 U/L (ref 39–117)
ALP SERPL-CCNC: 52 U/L (ref 39–117)
ALT SERPL W P-5'-P-CCNC: 27 U/L (ref 1–41)
ALT SERPL W P-5'-P-CCNC: 30 U/L (ref 1–41)
ANION GAP SERPL CALCULATED.3IONS-SCNC: 12.2 MMOL/L (ref 5–15)
AST SERPL-CCNC: 22 U/L (ref 1–40)
AST SERPL-CCNC: 22 U/L (ref 1–40)
BASOPHILS # BLD AUTO: 0.03 10*3/MM3 (ref 0–0.2)
BASOPHILS NFR BLD AUTO: 0.6 % (ref 0–1.5)
BILIRUB CONJ SERPL-MCNC: <0.2 MG/DL (ref 0–0.3)
BILIRUB INDIRECT SERPL-MCNC: NORMAL MG/DL
BILIRUB SERPL-MCNC: 0.3 MG/DL (ref 0–1.2)
BILIRUB SERPL-MCNC: 0.3 MG/DL (ref 0–1.2)
BUN SERPL-MCNC: 14 MG/DL (ref 6–20)
BUN/CREAT SERPL: 16.3 (ref 7–25)
CALCIUM SPEC-SCNC: 9.2 MG/DL (ref 8.6–10.5)
CHLORIDE SERPL-SCNC: 102 MMOL/L (ref 98–107)
CHOLEST SERPL-MCNC: 151 MG/DL (ref 0–200)
CO2 SERPL-SCNC: 23.8 MMOL/L (ref 22–29)
CREAT SERPL-MCNC: 0.86 MG/DL (ref 0.76–1.27)
DEPRECATED RDW RBC AUTO: 41.8 FL (ref 37–54)
EOSINOPHIL # BLD AUTO: 0.21 10*3/MM3 (ref 0–0.4)
EOSINOPHIL NFR BLD AUTO: 4.5 % (ref 0.3–6.2)
ERYTHROCYTE [DISTWIDTH] IN BLOOD BY AUTOMATED COUNT: 13.1 % (ref 12.3–15.4)
GFR SERPL CREATININE-BSD FRML MDRD: 97 ML/MIN/1.73
GLOBULIN UR ELPH-MCNC: 2.3 GM/DL
GLUCOSE SERPL-MCNC: 94 MG/DL (ref 65–99)
HCT VFR BLD AUTO: 45.7 % (ref 37.5–51)
HDLC SERPL-MCNC: 42 MG/DL (ref 40–60)
HGB BLD-MCNC: 15.2 G/DL (ref 13–17.7)
IMM GRANULOCYTES # BLD AUTO: 0.01 10*3/MM3 (ref 0–0.05)
IMM GRANULOCYTES NFR BLD AUTO: 0.2 % (ref 0–0.5)
LDLC SERPL CALC-MCNC: 82 MG/DL (ref 0–100)
LDLC/HDLC SERPL: 1.84 {RATIO}
LYMPHOCYTES # BLD AUTO: 1.3 10*3/MM3 (ref 0.7–3.1)
LYMPHOCYTES NFR BLD AUTO: 27.6 % (ref 19.6–45.3)
MCH RBC QN AUTO: 29.3 PG (ref 26.6–33)
MCHC RBC AUTO-ENTMCNC: 33.3 G/DL (ref 31.5–35.7)
MCV RBC AUTO: 88.1 FL (ref 79–97)
MONOCYTES # BLD AUTO: 0.36 10*3/MM3 (ref 0.1–0.9)
MONOCYTES NFR BLD AUTO: 7.6 % (ref 5–12)
NEUTROPHILS NFR BLD AUTO: 2.8 10*3/MM3 (ref 1.7–7)
NEUTROPHILS NFR BLD AUTO: 59.5 % (ref 42.7–76)
NRBC BLD AUTO-RTO: 0 /100 WBC (ref 0–0.2)
PLATELET # BLD AUTO: 222 10*3/MM3 (ref 140–450)
PMV BLD AUTO: 11.7 FL (ref 6–12)
POTASSIUM SERPL-SCNC: 4.3 MMOL/L (ref 3.5–5.2)
PROT SERPL-MCNC: 7.2 G/DL (ref 6–8.5)
PROT SERPL-MCNC: 7.5 G/DL (ref 6–8.5)
RBC # BLD AUTO: 5.19 10*6/MM3 (ref 4.14–5.8)
SODIUM SERPL-SCNC: 138 MMOL/L (ref 136–145)
T-UPTAKE NFR SERPL: 1.16 TBI (ref 0.8–1.3)
T4 SERPL-MCNC: 5.44 MCG/DL (ref 4.5–11.7)
TRIGL SERPL-MCNC: 158 MG/DL (ref 0–150)
TSH SERPL DL<=0.05 MIU/L-ACNC: 1.92 UIU/ML (ref 0.27–4.2)
URATE SERPL-MCNC: 3.8 MG/DL (ref 3.4–7)
VLDLC SERPL-MCNC: 27 MG/DL (ref 5–40)
WBC NRBC COR # BLD: 4.71 10*3/MM3 (ref 3.4–10.8)

## 2022-01-27 ENCOUNTER — PATIENT ROUNDING (BHMG ONLY) (OUTPATIENT)
Dept: FAMILY MEDICINE CLINIC | Facility: CLINIC | Age: 42
End: 2022-01-27

## 2022-01-27 NOTE — PROGRESS NOTES
January 27, 2022    Hello, may I speak with Alberto Mcclelland?    My name is Anila Rogers    I am  with [facility]  [address] [place] [id number] [phone].    Before we get started may I verify your date of birth? 1980    I am calling to officially welcome you to our practice and ask about your recent visit. Is this a good time to talk? No left VM    Tell me about your visit with us. What things went well?         We're always looking for ways to make our patients' experiences even better. Do you have recommendations on ways we may improve?      Overall were you satisfied with your first visit to our practice?        I appreciate you taking the time to speak with me today. Is there anything else I can do for you?       Thank you, and have a great day.

## 2022-02-04 ENCOUNTER — TELEPHONE (OUTPATIENT)
Dept: FAMILY MEDICINE CLINIC | Facility: CLINIC | Age: 42
End: 2022-02-04

## 2022-02-04 NOTE — TELEPHONE ENCOUNTER
Caller: Alberto Mcclelland    Relationship: Self    Best call back number:  [phone] (H)     What medication are you requesting: BP meds    If a prescription is needed, what is your preferred pharmacy and phone number: [facility] - [place], [place] - [address] - [phone]  - [phone]     Additional notes:   Pt stated that at his last visit he was asked to monitor his BP over the next few weeks, he said that he has been checking it and it is still running high. He is asking for a BP medication to be sent to pharmacy.       Systolic is running between 140-160   Diastolic between

## 2022-02-07 RX ORDER — LISINOPRIL 10 MG/1
10 TABLET ORAL DAILY
Qty: 30 TABLET | Refills: 2 | Status: SHIPPED | OUTPATIENT
Start: 2022-02-07 | End: 2022-05-10 | Stop reason: SDUPTHER

## 2022-02-07 NOTE — TELEPHONE ENCOUNTER
I have sent him lisinopril 10 mg daily. Please ask him to continue to monitor his blood pressure daily, ok to check before medication and then about 2 hours later. Encourage him to let us know how it is running after consistently taking medication daily for 1 week. He is welcome to stop by office for BP check as well. We will titrate medication up accordingly as needed.

## 2022-03-25 ENCOUNTER — TELEPHONE (OUTPATIENT)
Dept: FAMILY MEDICINE CLINIC | Facility: CLINIC | Age: 42
End: 2022-03-25

## 2022-03-25 NOTE — TELEPHONE ENCOUNTER
Left voicemail to let patient know Luisa Welch is leaving the office and that his appt on 4/19 has been cancelled.

## 2022-05-10 NOTE — TELEPHONE ENCOUNTER
Caller: Alberto Mcclelland    Relationship: Self    Best call back number [phone]    Requested Prescriptions:   Requested Prescriptions     Pending Prescriptions Disp Refills   • lisinopril (PRINIVIL,ZESTRIL) 10 MG tablet 30 tablet 2     Sig: Take 1 tablet by mouth Daily.        Pharmacy where request should be sent: [facility] [address] [phone]  - [phone]     Additional details provided by patient:   PATIENT HAS A WEEK LEFT OF MEDICATION      Does the patient have less than a 3 day supply:  [] Yes  [x] No    Darcy Narvaez Rep   05/10/22 16:53 EDT

## 2022-05-11 RX ORDER — LISINOPRIL 10 MG/1
10 TABLET ORAL DAILY
Qty: 30 TABLET | Refills: 2 | Status: SHIPPED | OUTPATIENT
Start: 2022-05-11

## 2022-05-11 NOTE — TELEPHONE ENCOUNTER
Will you all call him for an updated apt?     Lorenza he saw chavo last could you refill this until he can get in?

## 2022-08-05 ENCOUNTER — OFFICE VISIT (OUTPATIENT)
Dept: FAMILY MEDICINE CLINIC | Facility: CLINIC | Age: 42
End: 2022-08-05

## 2022-08-05 VITALS
HEIGHT: 70 IN | SYSTOLIC BLOOD PRESSURE: 124 MMHG | TEMPERATURE: 96.8 F | HEART RATE: 79 BPM | DIASTOLIC BLOOD PRESSURE: 80 MMHG | OXYGEN SATURATION: 99 % | BODY MASS INDEX: 27.92 KG/M2 | WEIGHT: 195 LBS

## 2022-08-05 DIAGNOSIS — I10 ESSENTIAL HYPERTENSION: Primary | Chronic | ICD-10-CM

## 2022-08-05 DIAGNOSIS — M19.90 ARTHRITIS: Chronic | ICD-10-CM

## 2022-08-05 DIAGNOSIS — F41.9 ANXIETY: Chronic | ICD-10-CM

## 2022-08-05 DIAGNOSIS — U09.9 POST-COVID SYNDROME: ICD-10-CM

## 2022-08-05 DIAGNOSIS — R56.9 SEIZURE: Chronic | ICD-10-CM

## 2022-08-05 PROBLEM — N39.0 RECURRENT UTI: Status: RESOLVED | Noted: 2018-05-17 | Resolved: 2022-08-05

## 2022-08-05 PROBLEM — K59.04 CHRONIC IDIOPATHIC CONSTIPATION: Status: RESOLVED | Noted: 2018-04-09 | Resolved: 2022-08-05

## 2022-08-05 PROCEDURE — 86431 RHEUMATOID FACTOR QUANT: CPT | Performed by: NURSE PRACTITIONER

## 2022-08-05 PROCEDURE — 80053 COMPREHEN METABOLIC PANEL: CPT | Performed by: NURSE PRACTITIONER

## 2022-08-05 PROCEDURE — 86140 C-REACTIVE PROTEIN: CPT | Performed by: NURSE PRACTITIONER

## 2022-08-05 PROCEDURE — 85652 RBC SED RATE AUTOMATED: CPT | Performed by: NURSE PRACTITIONER

## 2022-08-05 PROCEDURE — 99214 OFFICE O/P EST MOD 30 MIN: CPT | Performed by: NURSE PRACTITIONER

## 2022-08-05 PROCEDURE — 84443 ASSAY THYROID STIM HORMONE: CPT | Performed by: NURSE PRACTITIONER

## 2022-08-05 PROCEDURE — 82607 VITAMIN B-12: CPT | Performed by: NURSE PRACTITIONER

## 2022-08-05 PROCEDURE — 82306 VITAMIN D 25 HYDROXY: CPT | Performed by: NURSE PRACTITIONER

## 2022-08-05 PROCEDURE — 86038 ANTINUCLEAR ANTIBODIES: CPT | Performed by: NURSE PRACTITIONER

## 2022-08-05 NOTE — PROGRESS NOTES
"History of Present Illness  Alberto Mcclelland is a 41 y.o. male who presents to the office today for follow-up pertaining to his hypertension, Arthritis  and seizure disorder.  Alberto does report he was diagnosed with COVID July 11th and continues to recover from it slowly.    Hypertension  Prescribed lisinopril 10 mg which he tolerates well.     Seizures    Prescribed Tegretol, Phenytoin ER, and Lamictal per Neurologist, Dr Lyon. No recent seizure activity.      The following portions of the patient's history were reviewed and updated as appropriate: allergies, current medications, past family history, past medical history, past social history, past surgical history and problem list.    Review of Systems   Constitutional: Negative for activity change, appetite change, chills, fatigue, fever and unexpected weight change.   HENT: Negative.    Eyes: Negative for visual disturbance.   Respiratory: Positive for cough and chest tightness. Negative for shortness of breath and wheezing.    Cardiovascular: Negative for chest pain, palpitations and leg swelling.   Gastrointestinal: Negative for constipation, diarrhea, nausea and vomiting.   Endocrine: Negative for cold intolerance, heat intolerance, polydipsia, polyphagia and polyuria.   Musculoskeletal: Positive for arthralgias.   Skin: Negative for color change and rash.   Neurological: Positive for seizures. Negative for dizziness, tremors, speech difficulty, weakness, light-headedness and headaches.   Hematological: Negative for adenopathy.   Psychiatric/Behavioral: Negative for confusion and decreased concentration. The patient is not nervous/anxious.    All other systems reviewed and are negative.    Vital signs:  /80 (BP Location: Left arm)   Pulse 79   Temp 96.8 °F (36 °C) (Temporal)   Ht 177.8 cm (70\")   Wt 88.5 kg (195 lb)   SpO2 99%   BMI 27.98 kg/m²     Physical Exam  Vitals and nursing note reviewed.   Constitutional:       General: He is not in acute " distress.     Appearance: He is well-developed.      Interventions: Face mask in place.   HENT:      Head: Normocephalic.      Nose: Nose normal.   Eyes:      General: No scleral icterus.        Right eye: No discharge.         Left eye: No discharge.      Conjunctiva/sclera: Conjunctivae normal.   Neck:      Thyroid: No thyromegaly.      Vascular: No JVD.   Cardiovascular:      Rate and Rhythm: Normal rate and regular rhythm.      Heart sounds: Normal heart sounds. No murmur heard.    No friction rub.   Pulmonary:      Effort: Pulmonary effort is normal. No respiratory distress.      Breath sounds: Normal breath sounds. No decreased breath sounds, wheezing, rhonchi or rales.   Abdominal:      General: Bowel sounds are normal. There is no distension.      Palpations: Abdomen is soft.      Tenderness: There is no abdominal tenderness. There is no guarding or rebound.   Musculoskeletal:         General: Tenderness (Generalized ) present.      Cervical back: Neck supple.      Right lower leg: No edema.      Left lower leg: No edema.   Lymphadenopathy:      Cervical: No cervical adenopathy.   Skin:     General: Skin is warm and dry.      Capillary Refill: Capillary refill takes less than 2 seconds.   Neurological:      Mental Status: He is alert and oriented to person, place, and time.      Cranial Nerves: Cranial nerves are intact.      Gait: Gait is intact.   Psychiatric:         Mood and Affect: Mood and affect normal.         Speech: Speech normal.         Behavior: Behavior is cooperative.         Thought Content: Thought content normal.       Result Review : OV of 1/19/2022   BMI is >= 25 and <30. (Overweight) The following options were offered after discussion;: weight loss educational material (shared in after visit summary)     Assessment & Plan     Diagnoses and all orders for this visit:    1. Essential hypertension (Primary)  Comments:  Continue lisinopril  Orders:  -     Comprehensive Metabolic Panel  -      TSH    2. Arthritis  Comments:  Labs ordered  Orders:  -     CBC & Differential; Future  -     Sedimentation Rate  -     C-reactive Protein  -     Rheumatoid Factor  -     DAVID  -     Vitamin D 25 Hydroxy    3. Anxiety  Comments:  Stable  Orders:  -     Comprehensive Metabolic Panel  -     TSH    4. Seizure (HCC)  Comments:  Continue to follow-up with neurologist    5. Post-COVID syndrome  Comments:  Counseled regarding supportive care measures.  Signs and symptoms of concern reviewed and if occur to seek further evaluation  Orders:  -     Vitamin B12    Follow Up In November  Findings and recommendations discussed with Alberto. Reviewed previous test results. Lifestyle modifications reinforced including nutrition and activity recommendations.  Alberto will follow up in November sooner if problems/concerns occur.  Alberto was given instructions and counseling regarding his condition or for health maintenance advice. Please see specific information pulled into the AVS if appropriate    This document has been electronically signed by:

## 2022-08-06 LAB
25(OH)D3 SERPL-MCNC: 28.9 NG/ML (ref 30–100)
ALBUMIN SERPL-MCNC: 4.9 G/DL (ref 3.5–5.2)
ALBUMIN/GLOB SERPL: 2 G/DL
ALP SERPL-CCNC: 50 U/L (ref 39–117)
ALT SERPL W P-5'-P-CCNC: 22 U/L (ref 1–41)
ANION GAP SERPL CALCULATED.3IONS-SCNC: 11 MMOL/L (ref 5–15)
AST SERPL-CCNC: 21 U/L (ref 1–40)
BILIRUB SERPL-MCNC: 0.4 MG/DL (ref 0–1.2)
BUN SERPL-MCNC: 16 MG/DL (ref 6–20)
BUN/CREAT SERPL: 18.8 (ref 7–25)
CALCIUM SPEC-SCNC: 9.3 MG/DL (ref 8.6–10.5)
CHLORIDE SERPL-SCNC: 105 MMOL/L (ref 98–107)
CHROMATIN AB SERPL-ACNC: <10 IU/ML (ref 0–14)
CO2 SERPL-SCNC: 26 MMOL/L (ref 22–29)
CREAT SERPL-MCNC: 0.85 MG/DL (ref 0.76–1.27)
CRP SERPL-MCNC: 0.84 MG/DL (ref 0–0.5)
EGFRCR SERPLBLD CKD-EPI 2021: 110.6 ML/MIN/1.73
ERYTHROCYTE [SEDIMENTATION RATE] IN BLOOD: 8 MM/HR (ref 0–15)
GLOBULIN UR ELPH-MCNC: 2.5 GM/DL
GLUCOSE SERPL-MCNC: 56 MG/DL (ref 65–99)
POTASSIUM SERPL-SCNC: 4.2 MMOL/L (ref 3.5–5.2)
PROT SERPL-MCNC: 7.4 G/DL (ref 6–8.5)
SODIUM SERPL-SCNC: 142 MMOL/L (ref 136–145)
TSH SERPL DL<=0.05 MIU/L-ACNC: 1.05 UIU/ML (ref 0.27–4.2)
VIT B12 BLD-MCNC: 458 PG/ML (ref 211–946)

## 2022-08-08 DIAGNOSIS — E55.9 VITAMIN D DEFICIENCY: Primary | ICD-10-CM

## 2022-08-08 LAB — ANA SER QL: NEGATIVE

## 2022-08-08 RX ORDER — ERGOCALCIFEROL 1.25 MG/1
50000 CAPSULE ORAL WEEKLY
Qty: 5 CAPSULE | Refills: 3 | Status: SHIPPED | OUTPATIENT
Start: 2022-08-08

## 2023-06-15 ENCOUNTER — OFFICE VISIT (OUTPATIENT)
Dept: FAMILY MEDICINE CLINIC | Facility: CLINIC | Age: 43
End: 2023-06-15
Payer: COMMERCIAL

## 2023-06-15 VITALS
BODY MASS INDEX: 28.49 KG/M2 | HEART RATE: 68 BPM | SYSTOLIC BLOOD PRESSURE: 120 MMHG | HEIGHT: 70 IN | DIASTOLIC BLOOD PRESSURE: 80 MMHG | WEIGHT: 199 LBS | TEMPERATURE: 96.6 F | OXYGEN SATURATION: 98 %

## 2023-06-15 DIAGNOSIS — E55.9 VITAMIN D DEFICIENCY: Chronic | ICD-10-CM

## 2023-06-15 DIAGNOSIS — J30.9 ALLERGIC RHINITIS, UNSPECIFIED SEASONALITY, UNSPECIFIED TRIGGER: ICD-10-CM

## 2023-06-15 DIAGNOSIS — I10 ESSENTIAL HYPERTENSION: Primary | Chronic | ICD-10-CM

## 2023-06-15 DIAGNOSIS — J40 BRONCHITIS: ICD-10-CM

## 2023-06-15 DIAGNOSIS — M19.90 ARTHRITIS: Chronic | ICD-10-CM

## 2023-06-15 DIAGNOSIS — R05.1 ACUTE COUGH: ICD-10-CM

## 2023-06-15 DIAGNOSIS — R56.9 SEIZURE: Chronic | ICD-10-CM

## 2023-06-15 PROCEDURE — 86140 C-REACTIVE PROTEIN: CPT | Performed by: NURSE PRACTITIONER

## 2023-06-15 PROCEDURE — 85652 RBC SED RATE AUTOMATED: CPT | Performed by: NURSE PRACTITIONER

## 2023-06-15 PROCEDURE — 82306 VITAMIN D 25 HYDROXY: CPT | Performed by: NURSE PRACTITIONER

## 2023-06-15 PROCEDURE — 84550 ASSAY OF BLOOD/URIC ACID: CPT | Performed by: NURSE PRACTITIONER

## 2023-06-15 PROCEDURE — 80061 LIPID PANEL: CPT | Performed by: NURSE PRACTITIONER

## 2023-06-15 PROCEDURE — 80050 GENERAL HEALTH PANEL: CPT | Performed by: NURSE PRACTITIONER

## 2023-06-15 RX ORDER — BENZONATATE 200 MG/1
200 CAPSULE ORAL 3 TIMES DAILY PRN
Qty: 30 CAPSULE | Refills: 0 | Status: SHIPPED | OUTPATIENT
Start: 2023-06-15

## 2023-06-15 RX ORDER — AZELASTINE 1 MG/ML
SPRAY, METERED NASAL
Qty: 1 EACH | Refills: 1 | Status: SHIPPED | OUTPATIENT
Start: 2023-06-15

## 2023-06-15 RX ORDER — LISINOPRIL 10 MG/1
10 TABLET ORAL DAILY
Qty: 30 TABLET | Refills: 2 | Status: SHIPPED | OUTPATIENT
Start: 2023-06-15

## 2023-06-15 RX ORDER — CEFDINIR 300 MG/1
300 CAPSULE ORAL 2 TIMES DAILY
Qty: 20 CAPSULE | Refills: 0 | Status: SHIPPED | OUTPATIENT
Start: 2023-06-15 | End: 2023-06-25

## 2023-06-15 RX ORDER — LEVOCETIRIZINE DIHYDROCHLORIDE 5 MG/1
5 TABLET, FILM COATED ORAL EVERY EVENING
Qty: 30 TABLET | Refills: 0 | Status: SHIPPED | OUTPATIENT
Start: 2023-06-15

## 2023-06-15 NOTE — PROGRESS NOTES
History of Present Illness  Alberto Mcclelland is a 42 y.o. male who presents to the office today c/o upper respiratory symptoms which started within the week. He also reports his blood pressure was elevated at his Neurology appointment for his seizures yesterday.  In addition, he has been diagnosed with arthritis.    Upper Respiratory Symptoms   This is a new problem. The current episode started in the past 7 days. The problem has been gradually worsening. Associated symptoms include congestion, coughing and wheezing. He has tried decongestant for the symptoms.     Hypertension  This is a recurrent problem. The current episode started more than 1 year ago. The problem is unchanged. The problem is controlled. Pertinent negatives include no chest pain, headaches, palpitations or shortness of breath. There are no associated agents to hypertension. There are no known risk factors for coronary artery disease. There are no compliance problems.      Seizures    Prescribed Tegretol, Phenytoin ER, and Lamictal per Neurologist, Dr Lyon. No recent seizure activity.      The following portions of the patient's history were reviewed and updated as appropriate: allergies, current medications, past family history, past medical history, past social history, past surgical history and problem list.    Review of Systems   Constitutional:  Negative for activity change, appetite change, chills, fatigue, fever and unexpected weight change.   HENT:  Positive for congestion. Negative for ear discharge, ear pain, rhinorrhea, sinus pressure, sinus pain, sneezing, sore throat, tinnitus and trouble swallowing.    Eyes:  Negative for pain, discharge, redness, itching and visual disturbance.   Respiratory:  Positive for cough and wheezing. Negative for shortness of breath.    Cardiovascular:  Negative for chest pain, palpitations and leg swelling.   Gastrointestinal:  Negative for nausea and vomiting.   Endocrine: Negative for cold intolerance,  "heat intolerance, polydipsia, polyphagia and polyuria.   Musculoskeletal:  Positive for arthralgias.   Skin:  Negative for color change and rash.   Neurological:  Positive for seizures (Treated by Neurologist). Negative for dizziness, tremors, speech difficulty, weakness, light-headedness and headaches.   Hematological:  Negative for adenopathy.   Psychiatric/Behavioral:  Negative for confusion and decreased concentration. The patient is not nervous/anxious.    All other systems reviewed and are negative.    Vital signs:  /80 (BP Location: Left arm, Patient Position: Sitting, Cuff Size: Adult)   Pulse 68   Temp 96.6 °F (35.9 °C) (Temporal)   Ht 177.8 cm (70\")   Wt 90.3 kg (199 lb)   SpO2 98%   BMI 28.55 kg/m²     Physical Exam  Vitals and nursing note reviewed.   Constitutional:       General: He is not in acute distress.     Appearance: He is well-developed.   HENT:      Head: Normocephalic.      Right Ear: A middle ear effusion is present. Tympanic membrane is not erythematous or bulging.      Left Ear: A middle ear effusion is present. Tympanic membrane is not erythematous or bulging.      Nose: Nose normal.      Right Turbinates: Swollen and pale.      Left Turbinates: Swollen and pale.      Right Sinus: No maxillary sinus tenderness or frontal sinus tenderness.      Left Sinus: No maxillary sinus tenderness or frontal sinus tenderness.      Mouth/Throat:      Mouth: Mucous membranes are moist.   Eyes:      General: No scleral icterus.        Right eye: No discharge.         Left eye: No discharge.      Conjunctiva/sclera: Conjunctivae normal.   Neck:      Thyroid: No thyromegaly.      Vascular: No JVD.   Cardiovascular:      Rate and Rhythm: Normal rate and regular rhythm.      Heart sounds: Normal heart sounds. No murmur heard.    No friction rub.   Pulmonary:      Effort: Pulmonary effort is normal. No respiratory distress.      Breath sounds: Normal breath sounds. No decreased breath sounds, " wheezing, rhonchi or rales.   Abdominal:      General: Bowel sounds are normal. There is no distension.      Palpations: Abdomen is soft.      Tenderness: There is no abdominal tenderness. There is no guarding or rebound.   Musculoskeletal:         General: Tenderness (Generalized) present.      Cervical back: Neck supple.      Right lower leg: No edema.      Left lower leg: No edema.   Lymphadenopathy:      Cervical: No cervical adenopathy.   Skin:     General: Skin is warm and dry.      Capillary Refill: Capillary refill takes less than 2 seconds.   Neurological:      Mental Status: He is alert and oriented to person, place, and time.      Gait: Gait is intact.   Psychiatric:         Mood and Affect: Mood and affect normal.         Speech: Speech normal.         Behavior: Behavior is cooperative.         Thought Content: Thought content normal.     Result Review : Labs completed and will be reviewed with him by phone  BMI is >= 25 and <30. (Overweight) The following options were offered after discussion;: weight loss educational material (shared in after visit summary)       Assessment & Plan     Diagnoses and all orders for this visit:    1. Essential hypertension (Primary)  Comments:  Continue lisinopril 10 mg.  Home BP monitoring recommended  Orders:  -     Comprehensive Metabolic Panel  -     Lipid Panel  -     TSH  -     lisinopril (PRINIVIL,ZESTRIL) 10 MG tablet; Take 1 tablet by mouth Daily.  Dispense: 30 tablet; Refill: 2    2. Arthritis  Comments:  Continue conservative measures.  Labs today  Orders:  -     Sedimentation Rate  -     C-reactive Protein  -     Uric Acid    3. Seizure  Comments:  Continue Tegretol XL, Lamictal, and   Phenytoin    4. Vitamin D deficiency  Comments:  Vitamin D level ordered  Orders:  -     Vitamin D,25-Hydroxy    5. Bronchitis  Comments:  Counseled regarding supportive care measures  Orders:  -     CBC & Differential; Future  -     cefdinir (OMNICEF) 300 MG capsule; Take 1  capsule by mouth 2 (Two) Times a Day for 10 days.  Dispense: 20 capsule; Refill: 0  -     CBC & Differential    6. Allergic rhinitis, unspecified seasonality, unspecified trigger  Comments:  Astelin nasal spray and levocetirizine prescribed  Orders:  -     azelastine (ASTELIN) 0.1 % nasal spray; 2 sprays both nostrils twice daily as needed  Dispense: 1 each; Refill: 1  -     levocetirizine (XYZAL) 5 MG tablet; Take 1 tablet by mouth Every Evening.  Dispense: 30 tablet; Refill: 0    7. Acute cough  Comments:  Tessalon Perles 200 mg prescribed with instructions  Orders:  -     benzonatate (TESSALON) 200 MG capsule; Take 1 capsule by mouth 3 (Three) Times a Day As Needed for Cough.  Dispense: 30 capsule; Refill: 0    Follow Up in July  Findings and recommendations discussed with Alberto.  Treatment options reviewed.  Counseled regarding supportive care measures.  Reviewed signs and symptoms of concern and if occur to seek further evaluation or if symptoms worsen or do not improve.Lifestyle modifications reinforced including nutrition and activity recommendations.  Alberto will follow up in July sooner if problems/concerns occur.   He was given instructions and counseling regarding his condition or for health maintenance advice. Please see specific information pulled into the AVS if appropriate    This document has been electronically signed by:

## 2023-06-16 LAB
25(OH)D3 SERPL-MCNC: 27.7 NG/ML (ref 30–100)
ALBUMIN SERPL-MCNC: 4.8 G/DL (ref 3.5–5.2)
ALBUMIN/GLOB SERPL: 1.7 G/DL
ALP SERPL-CCNC: 59 U/L (ref 39–117)
ALT SERPL W P-5'-P-CCNC: 22 U/L (ref 1–41)
ANION GAP SERPL CALCULATED.3IONS-SCNC: 11 MMOL/L (ref 5–15)
AST SERPL-CCNC: 19 U/L (ref 1–40)
BASOPHILS # BLD AUTO: 0.07 10*3/MM3 (ref 0–0.2)
BASOPHILS NFR BLD AUTO: 1.2 % (ref 0–1.5)
BILIRUB SERPL-MCNC: 0.3 MG/DL (ref 0–1.2)
BUN SERPL-MCNC: 12 MG/DL (ref 6–20)
BUN/CREAT SERPL: 14.1 (ref 7–25)
CALCIUM SPEC-SCNC: 8.9 MG/DL (ref 8.6–10.5)
CHLORIDE SERPL-SCNC: 100 MMOL/L (ref 98–107)
CHOLEST SERPL-MCNC: 196 MG/DL (ref 0–200)
CO2 SERPL-SCNC: 26 MMOL/L (ref 22–29)
CREAT SERPL-MCNC: 0.85 MG/DL (ref 0.76–1.27)
CRP SERPL-MCNC: 2.37 MG/DL (ref 0–0.5)
DEPRECATED RDW RBC AUTO: 40.6 FL (ref 37–54)
EGFRCR SERPLBLD CKD-EPI 2021: 109.9 ML/MIN/1.73
EOSINOPHIL # BLD AUTO: 0.21 10*3/MM3 (ref 0–0.4)
EOSINOPHIL NFR BLD AUTO: 3.6 % (ref 0.3–6.2)
ERYTHROCYTE [DISTWIDTH] IN BLOOD BY AUTOMATED COUNT: 12.9 % (ref 12.3–15.4)
ERYTHROCYTE [SEDIMENTATION RATE] IN BLOOD: 18 MM/HR (ref 0–15)
GLOBULIN UR ELPH-MCNC: 2.9 GM/DL
GLUCOSE SERPL-MCNC: 89 MG/DL (ref 65–99)
HCT VFR BLD AUTO: 43.6 % (ref 37.5–51)
HDLC SERPL-MCNC: 45 MG/DL (ref 40–60)
HGB BLD-MCNC: 15 G/DL (ref 13–17.7)
IMM GRANULOCYTES # BLD AUTO: 0.02 10*3/MM3 (ref 0–0.05)
IMM GRANULOCYTES NFR BLD AUTO: 0.3 % (ref 0–0.5)
LDLC SERPL CALC-MCNC: 126 MG/DL (ref 0–100)
LDLC/HDLC SERPL: 2.73 {RATIO}
LYMPHOCYTES # BLD AUTO: 0.7 10*3/MM3 (ref 0.7–3.1)
LYMPHOCYTES NFR BLD AUTO: 11.9 % (ref 19.6–45.3)
MCH RBC QN AUTO: 29.9 PG (ref 26.6–33)
MCHC RBC AUTO-ENTMCNC: 34.4 G/DL (ref 31.5–35.7)
MCV RBC AUTO: 86.9 FL (ref 79–97)
MONOCYTES # BLD AUTO: 0.68 10*3/MM3 (ref 0.1–0.9)
MONOCYTES NFR BLD AUTO: 11.6 % (ref 5–12)
NEUTROPHILS NFR BLD AUTO: 4.2 10*3/MM3 (ref 1.7–7)
NEUTROPHILS NFR BLD AUTO: 71.4 % (ref 42.7–76)
NRBC BLD AUTO-RTO: 0 /100 WBC (ref 0–0.2)
PLATELET # BLD AUTO: 178 10*3/MM3 (ref 140–450)
PMV BLD AUTO: 12 FL (ref 6–12)
POTASSIUM SERPL-SCNC: 4.4 MMOL/L (ref 3.5–5.2)
PROT SERPL-MCNC: 7.7 G/DL (ref 6–8.5)
RBC # BLD AUTO: 5.02 10*6/MM3 (ref 4.14–5.8)
SODIUM SERPL-SCNC: 137 MMOL/L (ref 136–145)
TRIGL SERPL-MCNC: 141 MG/DL (ref 0–150)
TSH SERPL DL<=0.05 MIU/L-ACNC: 2.23 UIU/ML (ref 0.27–4.2)
URATE SERPL-MCNC: 4 MG/DL (ref 3.4–7)
VLDLC SERPL-MCNC: 25 MG/DL (ref 5–40)
WBC NRBC COR # BLD: 5.88 10*3/MM3 (ref 3.4–10.8)

## 2023-06-19 DIAGNOSIS — E55.9 VITAMIN D DEFICIENCY: ICD-10-CM

## 2023-06-19 RX ORDER — ERGOCALCIFEROL 1.25 MG/1
50000 CAPSULE ORAL WEEKLY
Qty: 5 CAPSULE | Refills: 3 | Status: SHIPPED | OUTPATIENT
Start: 2023-06-19

## 2023-08-15 DIAGNOSIS — J30.9 ALLERGIC RHINITIS, UNSPECIFIED SEASONALITY, UNSPECIFIED TRIGGER: ICD-10-CM

## 2023-08-15 RX ORDER — AZELASTINE 1 MG/ML
SPRAY, METERED NASAL
Qty: 30 ML | Refills: 1 | Status: SHIPPED | OUTPATIENT
Start: 2023-08-15

## 2023-09-12 DIAGNOSIS — I10 ESSENTIAL HYPERTENSION: Chronic | ICD-10-CM

## 2023-09-12 RX ORDER — LISINOPRIL 10 MG/1
TABLET ORAL
Qty: 30 TABLET | Refills: 2 | Status: SHIPPED | OUTPATIENT
Start: 2023-09-12

## 2023-10-05 DIAGNOSIS — J30.9 ALLERGIC RHINITIS, UNSPECIFIED SEASONALITY, UNSPECIFIED TRIGGER: ICD-10-CM

## 2023-10-05 RX ORDER — AZELASTINE 1 MG/ML
SPRAY, METERED NASAL
Qty: 30 ML | Refills: 3 | Status: SHIPPED | OUTPATIENT
Start: 2023-10-05

## 2023-11-05 DIAGNOSIS — E55.9 VITAMIN D DEFICIENCY: ICD-10-CM

## 2023-11-06 ENCOUNTER — TELEPHONE (OUTPATIENT)
Dept: FAMILY MEDICINE CLINIC | Facility: CLINIC | Age: 43
End: 2023-11-06
Payer: COMMERCIAL

## 2023-11-06 RX ORDER — ERGOCALCIFEROL 1.25 MG/1
50000 CAPSULE ORAL WEEKLY
Qty: 5 CAPSULE | Refills: 3 | Status: SHIPPED | OUTPATIENT
Start: 2023-11-06

## 2023-12-04 DIAGNOSIS — I10 ESSENTIAL HYPERTENSION: Chronic | ICD-10-CM

## 2023-12-04 RX ORDER — LISINOPRIL 10 MG/1
TABLET ORAL
Qty: 30 TABLET | Refills: 2 | OUTPATIENT
Start: 2023-12-04

## 2024-02-07 DIAGNOSIS — J30.9 ALLERGIC RHINITIS, UNSPECIFIED SEASONALITY, UNSPECIFIED TRIGGER: ICD-10-CM

## 2024-02-07 RX ORDER — AZELASTINE 1 MG/ML
SPRAY, METERED NASAL
Qty: 30 ML | Refills: 0 | Status: SHIPPED | OUTPATIENT
Start: 2024-02-07

## 2024-03-29 ENCOUNTER — OFFICE VISIT (OUTPATIENT)
Dept: FAMILY MEDICINE CLINIC | Facility: CLINIC | Age: 44
End: 2024-03-29
Payer: COMMERCIAL

## 2024-03-29 VITALS
HEART RATE: 94 BPM | BODY MASS INDEX: 31.34 KG/M2 | SYSTOLIC BLOOD PRESSURE: 124 MMHG | HEIGHT: 70 IN | DIASTOLIC BLOOD PRESSURE: 80 MMHG | TEMPERATURE: 98.8 F | OXYGEN SATURATION: 97 % | WEIGHT: 218.92 LBS

## 2024-03-29 DIAGNOSIS — J40 BRONCHITIS: ICD-10-CM

## 2024-03-29 DIAGNOSIS — M19.90 ARTHRITIS: Chronic | ICD-10-CM

## 2024-03-29 DIAGNOSIS — J10.1 INFLUENZA A: Primary | ICD-10-CM

## 2024-03-29 DIAGNOSIS — E55.9 VITAMIN D DEFICIENCY: Chronic | ICD-10-CM

## 2024-03-29 DIAGNOSIS — I10 ESSENTIAL HYPERTENSION: Chronic | ICD-10-CM

## 2024-03-29 DIAGNOSIS — R05.1 ACUTE COUGH: ICD-10-CM

## 2024-03-29 LAB
B PARAPERT DNA SPEC QL NAA+PROBE: NOT DETECTED
B PERT DNA SPEC QL NAA+PROBE: NOT DETECTED
C PNEUM DNA NPH QL NAA+NON-PROBE: NOT DETECTED
EXPIRATION DATE: NORMAL
FLUAV H3 RNA NPH QL NAA+PROBE: DETECTED
FLUAV RNA RESP QL NAA+PROBE: NEGATIVE
FLUAV SUBTYP SPEC NAA+PROBE: NOT DETECTED
FLUBV RNA ISLT QL NAA+PROBE: NOT DETECTED
FLUBV RNA RESP QL NAA+PROBE: NEGATIVE
HADV DNA SPEC NAA+PROBE: NOT DETECTED
HCOV 229E RNA SPEC QL NAA+PROBE: NOT DETECTED
HCOV HKU1 RNA SPEC QL NAA+PROBE: NOT DETECTED
HCOV NL63 RNA SPEC QL NAA+PROBE: NOT DETECTED
HCOV OC43 RNA SPEC QL NAA+PROBE: NOT DETECTED
HMPV RNA NPH QL NAA+NON-PROBE: NOT DETECTED
HPIV1 RNA ISLT QL NAA+PROBE: NOT DETECTED
HPIV2 RNA SPEC QL NAA+PROBE: NOT DETECTED
HPIV3 RNA NPH QL NAA+PROBE: NOT DETECTED
HPIV4 P GENE NPH QL NAA+PROBE: NOT DETECTED
INTERNAL CONTROL: NORMAL
Lab: NORMAL
M PNEUMO IGG SER IA-ACNC: NOT DETECTED
RHINOVIRUS RNA SPEC NAA+PROBE: NOT DETECTED
RSV RNA NPH QL NAA+NON-PROBE: NOT DETECTED
SARS-COV-2 RNA NPH QL NAA+NON-PROBE: NOT DETECTED

## 2024-03-29 PROCEDURE — 0202U NFCT DS 22 TRGT SARS-COV-2: CPT | Performed by: NURSE PRACTITIONER

## 2024-03-29 PROCEDURE — 86140 C-REACTIVE PROTEIN: CPT | Performed by: NURSE PRACTITIONER

## 2024-03-29 PROCEDURE — 82306 VITAMIN D 25 HYDROXY: CPT | Performed by: NURSE PRACTITIONER

## 2024-03-29 PROCEDURE — 80061 LIPID PANEL: CPT | Performed by: NURSE PRACTITIONER

## 2024-03-29 PROCEDURE — 80050 GENERAL HEALTH PANEL: CPT | Performed by: NURSE PRACTITIONER

## 2024-03-29 PROCEDURE — 83735 ASSAY OF MAGNESIUM: CPT | Performed by: NURSE PRACTITIONER

## 2024-03-29 PROCEDURE — 85652 RBC SED RATE AUTOMATED: CPT | Performed by: NURSE PRACTITIONER

## 2024-03-29 RX ORDER — ERGOCALCIFEROL 1.25 MG/1
50000 CAPSULE ORAL WEEKLY
Qty: 12 CAPSULE | Refills: 1 | Status: SHIPPED | OUTPATIENT
Start: 2024-03-29

## 2024-03-29 RX ORDER — METHYLPREDNISOLONE ACETATE 40 MG/ML
40 INJECTION, SUSPENSION INTRA-ARTICULAR; INTRALESIONAL; INTRAMUSCULAR; SOFT TISSUE ONCE
Status: COMPLETED | OUTPATIENT
Start: 2024-03-29 | End: 2024-03-29

## 2024-03-29 RX ORDER — LISINOPRIL 10 MG/1
10 TABLET ORAL DAILY
Qty: 90 TABLET | Refills: 1 | Status: SHIPPED | OUTPATIENT
Start: 2024-03-29

## 2024-03-29 RX ADMIN — METHYLPREDNISOLONE ACETATE 40 MG: 40 INJECTION, SUSPENSION INTRA-ARTICULAR; INTRALESIONAL; INTRAMUSCULAR; SOFT TISSUE at 12:23

## 2024-03-29 NOTE — PROGRESS NOTES
History of Present Illness  Alberto Mcclelland is a 43 y.o. male who presents to the clinic today complaining of upper respiratory symptoms which started yesterday..  Alberto reports he has been exposed to influenza.  He has been diagnosed with hypertension, seizures and arthritis.    Upper Respiratory Symptoms  The current episode started yesterday. The maximum temperature recorded prior to his arrival was 100.4 - 100.9 F. The fever has been present for Less than 1 day. Associated symptoms include congestion, coughing and wheezing. He has tried acetaminophen for the symptoms. The treatment provided mild relief.     Hypertension  This is a recurrent problem. The current episode started more than 1 year ago. The problem is unchanged. The problem is controlled. Pertinent negatives include no chest   There are no known risk factors for coronary artery disease. There are no compliance problems.      Seizures    Prescribed Tegretol, Phenytoin ER, and Lamictal per Neurologist, Dr Lyon. No recent seizure activity.      The following portions of the patient's history were reviewed and updated as appropriate: allergies, current medications, past family history, past medical history, past social history, past surgical history and problem list.    Review of Systems   Constitutional:  Positive for chills and fever. Negative for activity change, appetite change, fatigue and unexpected weight change.   HENT: Negative.  Positive for congestion and postnasal drip. Negative for ear discharge, ear pain, rhinorrhea, sinus pressure, sinus pain and sore throat.    Eyes:  Negative for visual disturbance.   Respiratory:  Positive for cough and wheezing. Negative for chest tightness and shortness of breath.    Cardiovascular:  Negative for chest pain, palpitations and leg swelling.   Gastrointestinal:  Negative for nausea and vomiting.   Endocrine: Negative for cold intolerance, heat intolerance, polydipsia, polyphagia and polyuria.  "  Musculoskeletal:  Positive for myalgias.   Skin:  Negative for color change and rash.   Neurological:  Negative for dizziness, tremors, speech difficulty, weakness, light-headedness and headaches.   Hematological:  Negative for adenopathy.   Psychiatric/Behavioral:  Negative for confusion and decreased concentration.    All other systems reviewed and are negative.      Vital signs:  /80 (BP Location: Right arm, Patient Position: Sitting, Cuff Size: Adult)   Pulse 94   Temp 98.8 °F (37.1 °C) (Temporal)   Ht 177.8 cm (70\")   Wt 99.3 kg (218 lb 14.7 oz)   SpO2 97%   BMI 31.41 kg/m²     Physical Exam  Vitals and nursing note reviewed.   Constitutional:       General: He is not in acute distress.     Appearance: He is well-developed.   HENT:      Head: Normocephalic.      Right Ear: A middle ear effusion is present. Tympanic membrane is not erythematous or bulging.      Left Ear: A middle ear effusion is present. Tympanic membrane is not erythematous or bulging.      Nose: Nose normal.      Right Turbinates: Swollen and pale.      Left Turbinates: Swollen and pale.      Right Sinus: No maxillary sinus tenderness or frontal sinus tenderness.      Left Sinus: No maxillary sinus tenderness or frontal sinus tenderness.      Mouth/Throat:      Mouth: Mucous membranes are moist.   Eyes:      General: No scleral icterus.        Right eye: No discharge.         Left eye: No discharge.      Conjunctiva/sclera: Conjunctivae normal.   Neck:      Thyroid: No thyromegaly.      Vascular: No JVD.   Cardiovascular:      Rate and Rhythm: Normal rate and regular rhythm.      Heart sounds: Normal heart sounds. No murmur heard.     No friction rub.   Pulmonary:      Effort: Pulmonary effort is normal. No respiratory distress.      Breath sounds: Normal breath sounds. No decreased breath sounds, wheezing, rhonchi or rales.   Abdominal:      General: Bowel sounds are normal. There is no distension.      Palpations: Abdomen is " soft.      Tenderness: There is no abdominal tenderness. There is no guarding or rebound.   Musculoskeletal:         General: Tenderness (Generalized) present.      Cervical back: Neck supple.      Right lower leg: No edema.      Left lower leg: No edema.   Lymphadenopathy:      Cervical: No cervical adenopathy.   Skin:     General: Skin is warm and dry.      Capillary Refill: Capillary refill takes less than 2 seconds.   Neurological:      Mental Status: He is alert and oriented to person, place, and time.      Gait: Gait is intact.   Psychiatric:         Mood and Affect: Mood and affect normal.         Speech: Speech normal.         Behavior: Behavior is cooperative.         Thought Content: Thought content normal.       Result Review :  Results for orders placed or performed in visit on 03/29/24   Respiratory Panel PCR w/COVID-19(SARS-CoV-2) ARIAN/MARTY/JENNY/PAD/COR/SUSHILA In-House, NP Swab in UTM/VTM, 2 HR TAT - Swab, Nasopharynx    Specimen: Nasopharynx; Swab   Result Value Ref Range    ADENOVIRUS, PCR Not Detected Not Detected    Coronavirus 229E Not Detected Not Detected    Coronavirus HKU1 Not Detected Not Detected    Coronavirus NL63 Not Detected Not Detected    Coronavirus OC43 Not Detected Not Detected    COVID19 Not Detected Not Detected - Ref. Range    Human Metapneumovirus Not Detected Not Detected    Human Rhinovirus/Enterovirus Not Detected Not Detected    Influenza A PCR Not Detected Not Detected    Influenza A H3 Detected (A) Not Detected    Influenza B PCR Not Detected Not Detected    Parainfluenza Virus 1 Not Detected Not Detected    Parainfluenza Virus 2 Not Detected Not Detected    Parainfluenza Virus 3 Not Detected Not Detected    Parainfluenza Virus 4 Not Detected Not Detected    RSV, PCR Not Detected Not Detected    Bordetella pertussis pcr Not Detected Not Detected    Bordetella parapertussis PCR Not Detected Not Detected    Chlamydophila pneumoniae PCR Not Detected Not Detected    Mycoplasma  pneumo by PCR Not Detected Not Detected   Comprehensive Metabolic Panel    Specimen: Blood   Result Value Ref Range    Glucose 96 65 - 99 mg/dL    BUN 11 6 - 20 mg/dL    Creatinine 0.97 0.76 - 1.27 mg/dL    Sodium 137 136 - 145 mmol/L    Potassium 4.3 3.5 - 5.2 mmol/L    Chloride 101 98 - 107 mmol/L    CO2 23.7 22.0 - 29.0 mmol/L    Calcium 8.9 8.6 - 10.5 mg/dL    Total Protein 7.6 6.0 - 8.5 g/dL    Albumin 4.7 3.5 - 5.2 g/dL    ALT (SGPT) 33 1 - 41 U/L    AST (SGOT) 28 1 - 40 U/L    Alkaline Phosphatase 63 39 - 117 U/L    Total Bilirubin 0.3 0.0 - 1.2 mg/dL    Globulin 2.9 gm/dL    A/G Ratio 1.6 g/dL    BUN/Creatinine Ratio 11.3 7.0 - 25.0    Anion Gap 12.3 5.0 - 15.0 mmol/L    eGFR 98.1 >60.0 mL/min/1.73   Lipid Panel    Specimen: Blood   Result Value Ref Range    Total Cholesterol 178 0 - 200 mg/dL    Triglycerides 124 0 - 150 mg/dL    HDL Cholesterol 42 40 - 60 mg/dL    LDL Cholesterol  114 (H) 0 - 100 mg/dL    VLDL Cholesterol 22 5 - 40 mg/dL    LDL/HDL Ratio 2.65    TSH    Specimen: Blood   Result Value Ref Range    TSH 0.679 0.270 - 4.200 uIU/mL   Vitamin D,25-Hydroxy    Specimen: Blood   Result Value Ref Range    25 Hydroxy, Vitamin D 20.9 (L) 30.0 - 100.0 ng/ml   Magnesium    Specimen: Blood   Result Value Ref Range    Magnesium 2.1 1.6 - 2.6 mg/dL   Sedimentation Rate    Specimen: Arm, Right; Blood   Result Value Ref Range    Sed Rate 12 0 - 15 mm/hr   C-reactive Protein    Specimen: Blood   Result Value Ref Range    C-Reactive Protein 1.53 (H) 0.00 - 0.50 mg/dL   CBC Auto Differential    Specimen: Arm, Right; Blood   Result Value Ref Range    WBC 6.20 3.40 - 10.80 10*3/mm3    RBC 4.96 4.14 - 5.80 10*6/mm3    Hemoglobin 14.7 13.0 - 17.7 g/dL    Hematocrit 43.6 37.5 - 51.0 %    MCV 87.9 79.0 - 97.0 fL    MCH 29.6 26.6 - 33.0 pg    MCHC 33.7 31.5 - 35.7 g/dL    RDW 12.8 12.3 - 15.4 %    RDW-SD 41.8 37.0 - 54.0 fl    MPV 11.9 6.0 - 12.0 fL    Platelets 189 140 - 450 10*3/mm3    Neutrophil % 82.3 (H) 42.7 -  76.0 %    Lymphocyte % 6.0 (L) 19.6 - 45.3 %    Monocyte % 8.5 5.0 - 12.0 %    Eosinophil % 2.1 0.3 - 6.2 %    Basophil % 0.8 0.0 - 1.5 %    Immature Grans % 0.3 0.0 - 0.5 %    Neutrophils, Absolute 5.10 1.70 - 7.00 10*3/mm3    Lymphocytes, Absolute 0.37 (L) 0.70 - 3.10 10*3/mm3    Monocytes, Absolute 0.53 0.10 - 0.90 10*3/mm3    Eosinophils, Absolute 0.13 0.00 - 0.40 10*3/mm3    Basophils, Absolute 0.05 0.00 - 0.20 10*3/mm3    Immature Grans, Absolute 0.02 0.00 - 0.05 10*3/mm3    nRBC 0.0 0.0 - 0.2 /100 WBC   POCT Flu A&B, Molecular    Specimen: Swab   Result Value Ref Range    POC Influenza A, Molecular Negative Negative / Not Detected    POC Influenza B, Molecular Negative Negative / Not Detected    Internal Control Passed Passed    Lot Number [id number]     Expiration Date 08-31-25       BMI is >= 25 and <30. (Overweight) The following options were offered after discussion;: weight loss educational material (shared in after visit summary)    Assessment & Plan     Diagnoses and all orders for this visit:    1. Influenza A (Primary)  Comments:  Treatment options reviewed.  He would like to begin Tamiflu since he is within parameters.    2. Essential hypertension  Comments:  Continue lisinopril 10 mg.  Home BP monitoring recommended  Orders:  -     lisinopril (PRINIVIL,ZESTRIL) 10 MG tablet; Take 1 tablet by mouth Daily.  Dispense: 90 tablet; Refill: 1  -     Comprehensive Metabolic Panel  -     Lipid Panel  -     TSH  -     Magnesium    3. Arthritis  Comments:  Continue conservative measures  Orders:  -     Sedimentation Rate  -     C-reactive Protein    4. Vitamin D deficiency  Comments:  Continue weekly vitamin D  Orders:  -     Vitamin D,25-Hydroxy  -     vitamin D (ERGOCALCIFEROL) 1.25 MG (60813 UT) capsule capsule; Take 1 capsule by mouth 1 (One) Time Per Week.  Dispense: 12 capsule; Refill: 1    5. Acute cough  Comments:  Will manage with conservative measures.  Orders:  -     Respiratory Panel PCR  w/COVID-19(SARS-CoV-2) ARIAN/MARTY/JENNY/PAD/COR/SUSHILA In-House, NP Swab in UTM/VTM, 2 HR TAT - Swab, Nasopharynx; Future  -     Respiratory Panel PCR w/COVID-19(SARS-CoV-2) ARIAN/MARTY/JENNY/PAD/COR/SUSHILA In-House, NP Swab in UTM/VTM, 2 HR TAT - Swab, Nasopharynx  -     CBC & Differential  -     POCT Flu A&B, Molecular    6. Bronchitis  Comments:  Depo-Medrol 40 given today  Orders:  -     CBC & Differential  -     methylPREDNISolone acetate (DEPO-medrol) injection 40 mg    Follow Up In September  Findings and recommendations discussed with Alberto. Reviewed test results with Alberto.  Discussed positive influenza A results per respiratory panel.  He would like to begin Tamiflu today.  Counseled regarding supportive care and infectious control measures.  Signs and symptoms of concern reviewed and if occur to seek further evaluation or if symptoms worsen or do not improve.  Lifestyle modifications reinforced including nutrition and activity recommendations.  Discussed the need of colon cancer screening which he declined today but will consider.  Alberto was given instructions and counseling regarding his condition or for health maintenance advice. Please see specific information pulled into the AVS if appropriate.    This document has been electronically signed by:

## 2024-03-30 LAB
25(OH)D3 SERPL-MCNC: 20.9 NG/ML (ref 30–100)
ALBUMIN SERPL-MCNC: 4.7 G/DL (ref 3.5–5.2)
ALBUMIN/GLOB SERPL: 1.6 G/DL
ALP SERPL-CCNC: 63 U/L (ref 39–117)
ALT SERPL W P-5'-P-CCNC: 33 U/L (ref 1–41)
ANION GAP SERPL CALCULATED.3IONS-SCNC: 12.3 MMOL/L (ref 5–15)
AST SERPL-CCNC: 28 U/L (ref 1–40)
BASOPHILS # BLD AUTO: 0.05 10*3/MM3 (ref 0–0.2)
BASOPHILS NFR BLD AUTO: 0.8 % (ref 0–1.5)
BILIRUB SERPL-MCNC: 0.3 MG/DL (ref 0–1.2)
BUN SERPL-MCNC: 11 MG/DL (ref 6–20)
BUN/CREAT SERPL: 11.3 (ref 7–25)
CALCIUM SPEC-SCNC: 8.9 MG/DL (ref 8.6–10.5)
CHLORIDE SERPL-SCNC: 101 MMOL/L (ref 98–107)
CHOLEST SERPL-MCNC: 178 MG/DL (ref 0–200)
CO2 SERPL-SCNC: 23.7 MMOL/L (ref 22–29)
CREAT SERPL-MCNC: 0.97 MG/DL (ref 0.76–1.27)
CRP SERPL-MCNC: 1.53 MG/DL (ref 0–0.5)
DEPRECATED RDW RBC AUTO: 41.8 FL (ref 37–54)
EGFRCR SERPLBLD CKD-EPI 2021: 98.1 ML/MIN/1.73
EOSINOPHIL # BLD AUTO: 0.13 10*3/MM3 (ref 0–0.4)
EOSINOPHIL NFR BLD AUTO: 2.1 % (ref 0.3–6.2)
ERYTHROCYTE [DISTWIDTH] IN BLOOD BY AUTOMATED COUNT: 12.8 % (ref 12.3–15.4)
ERYTHROCYTE [SEDIMENTATION RATE] IN BLOOD: 12 MM/HR (ref 0–15)
GLOBULIN UR ELPH-MCNC: 2.9 GM/DL
GLUCOSE SERPL-MCNC: 96 MG/DL (ref 65–99)
HCT VFR BLD AUTO: 43.6 % (ref 37.5–51)
HDLC SERPL-MCNC: 42 MG/DL (ref 40–60)
HGB BLD-MCNC: 14.7 G/DL (ref 13–17.7)
IMM GRANULOCYTES # BLD AUTO: 0.02 10*3/MM3 (ref 0–0.05)
IMM GRANULOCYTES NFR BLD AUTO: 0.3 % (ref 0–0.5)
LDLC SERPL CALC-MCNC: 114 MG/DL (ref 0–100)
LDLC/HDLC SERPL: 2.65 {RATIO}
LYMPHOCYTES # BLD AUTO: 0.37 10*3/MM3 (ref 0.7–3.1)
LYMPHOCYTES NFR BLD AUTO: 6 % (ref 19.6–45.3)
MAGNESIUM SERPL-MCNC: 2.1 MG/DL (ref 1.6–2.6)
MCH RBC QN AUTO: 29.6 PG (ref 26.6–33)
MCHC RBC AUTO-ENTMCNC: 33.7 G/DL (ref 31.5–35.7)
MCV RBC AUTO: 87.9 FL (ref 79–97)
MONOCYTES # BLD AUTO: 0.53 10*3/MM3 (ref 0.1–0.9)
MONOCYTES NFR BLD AUTO: 8.5 % (ref 5–12)
NEUTROPHILS NFR BLD AUTO: 5.1 10*3/MM3 (ref 1.7–7)
NEUTROPHILS NFR BLD AUTO: 82.3 % (ref 42.7–76)
NRBC BLD AUTO-RTO: 0 /100 WBC (ref 0–0.2)
PLATELET # BLD AUTO: 189 10*3/MM3 (ref 140–450)
PMV BLD AUTO: 11.9 FL (ref 6–12)
POTASSIUM SERPL-SCNC: 4.3 MMOL/L (ref 3.5–5.2)
PROT SERPL-MCNC: 7.6 G/DL (ref 6–8.5)
RBC # BLD AUTO: 4.96 10*6/MM3 (ref 4.14–5.8)
SODIUM SERPL-SCNC: 137 MMOL/L (ref 136–145)
TRIGL SERPL-MCNC: 124 MG/DL (ref 0–150)
TSH SERPL DL<=0.05 MIU/L-ACNC: 0.68 UIU/ML (ref 0.27–4.2)
VLDLC SERPL-MCNC: 22 MG/DL (ref 5–40)
WBC NRBC COR # BLD AUTO: 6.2 10*3/MM3 (ref 3.4–10.8)

## 2024-03-30 RX ORDER — OSELTAMIVIR PHOSPHATE 75 MG/1
75 CAPSULE ORAL 2 TIMES DAILY
Qty: 10 CAPSULE | Refills: 0 | Status: SHIPPED | OUTPATIENT
Start: 2024-03-30 | End: 2024-04-04

## 2024-05-06 DIAGNOSIS — E55.9 VITAMIN D DEFICIENCY: Chronic | ICD-10-CM

## 2024-05-06 RX ORDER — ERGOCALCIFEROL 1.25 MG/1
50000 CAPSULE ORAL WEEKLY
Qty: 5 CAPSULE | Refills: 3 | Status: SHIPPED | OUTPATIENT
Start: 2024-05-06

## 2024-09-01 DIAGNOSIS — I10 ESSENTIAL HYPERTENSION: Chronic | ICD-10-CM

## 2024-09-03 RX ORDER — LISINOPRIL 10 MG/1
10 TABLET ORAL DAILY
Qty: 30 TABLET | Refills: 0 | Status: SHIPPED | OUTPATIENT
Start: 2024-09-03

## 2024-10-10 DIAGNOSIS — I10 ESSENTIAL HYPERTENSION: Chronic | ICD-10-CM

## 2024-10-11 RX ORDER — LISINOPRIL 10 MG/1
10 TABLET ORAL DAILY
Qty: 30 TABLET | Refills: 0 | Status: SHIPPED | OUTPATIENT
Start: 2024-10-11

## 2024-10-28 DIAGNOSIS — E55.9 VITAMIN D DEFICIENCY: Chronic | ICD-10-CM

## 2024-10-28 RX ORDER — ERGOCALCIFEROL 1.25 MG/1
50000 CAPSULE, LIQUID FILLED ORAL WEEKLY
Qty: 12 CAPSULE | Refills: 0 | Status: SHIPPED | OUTPATIENT
Start: 2024-10-28

## 2024-11-06 ENCOUNTER — OFFICE VISIT (OUTPATIENT)
Dept: FAMILY MEDICINE CLINIC | Facility: CLINIC | Age: 44
End: 2024-11-06
Payer: COMMERCIAL

## 2024-11-06 VITALS
WEIGHT: 212.96 LBS | RESPIRATION RATE: 14 BRPM | SYSTOLIC BLOOD PRESSURE: 130 MMHG | HEIGHT: 70 IN | HEART RATE: 76 BPM | TEMPERATURE: 96.9 F | OXYGEN SATURATION: 100 % | DIASTOLIC BLOOD PRESSURE: 80 MMHG | BODY MASS INDEX: 30.49 KG/M2

## 2024-11-06 DIAGNOSIS — M19.90 ARTHRITIS: Chronic | ICD-10-CM

## 2024-11-06 DIAGNOSIS — R56.9 SEIZURE: Chronic | ICD-10-CM

## 2024-11-06 DIAGNOSIS — E55.9 VITAMIN D DEFICIENCY: Chronic | ICD-10-CM

## 2024-11-06 DIAGNOSIS — F41.9 ANXIETY: ICD-10-CM

## 2024-11-06 DIAGNOSIS — I10 ESSENTIAL HYPERTENSION: Primary | Chronic | ICD-10-CM

## 2024-11-06 PROCEDURE — 80050 GENERAL HEALTH PANEL: CPT | Performed by: NURSE PRACTITIONER

## 2024-11-06 PROCEDURE — 80061 LIPID PANEL: CPT | Performed by: NURSE PRACTITIONER

## 2024-11-06 PROCEDURE — 82306 VITAMIN D 25 HYDROXY: CPT | Performed by: NURSE PRACTITIONER

## 2024-11-06 PROCEDURE — 85652 RBC SED RATE AUTOMATED: CPT | Performed by: NURSE PRACTITIONER

## 2024-11-06 PROCEDURE — 86140 C-REACTIVE PROTEIN: CPT | Performed by: NURSE PRACTITIONER

## 2024-11-06 PROCEDURE — 84550 ASSAY OF BLOOD/URIC ACID: CPT | Performed by: NURSE PRACTITIONER

## 2024-11-06 RX ORDER — LISINOPRIL 10 MG/1
10 TABLET ORAL DAILY
Qty: 90 TABLET | Refills: 1 | Status: SHIPPED | OUTPATIENT
Start: 2024-11-06

## 2024-11-06 NOTE — PROGRESS NOTES
History of Present Illness  Alberto Mcclelland is a 44 y.o. male who presents to [facility] today pertaining to his hypertension.  In addition he has been diagnosed with seizures, anxiety and arthritis.   Hypertension  This is a recurrent problem. The current episode started more than 1 year ago. The problem is unchanged. The problem is controlled. Pertinent negatives include no chest pain, headaches, palpitations or shortness of breath. There are no associated agents to hypertension. There are no known risk factors for coronary artery disease. There are no compliance problems.      Lab Results   Component Value Date    GLUCOSE 96 03/29/2024    BUN 11 03/29/2024    CREATININE 0.97 03/29/2024     03/29/2024    K 4.3 03/29/2024     03/29/2024    CALCIUM 8.9 03/29/2024    PROTEINTOT 7.6 03/29/2024    ALBUMIN 4.7 03/29/2024    ALT 33 03/29/2024    AST 28 03/29/2024    ALKPHOS 63 03/29/2024    BILITOT 0.3 03/29/2024    GLOB 2.9 03/29/2024    AGRATIO 1.6 03/29/2024    BCR 11.3 03/29/2024    ANIONGAP 12.3 03/29/2024    EGFR 98.1 03/29/2024     Seizures    Prescribed Tegretol, Phenytoin ER, and Lamictal per Neurologist, Dr Lyon. No recent seizure activity.      The following portions of the patient's history were reviewed and updated as appropriate: allergies, current medications, past family history, past medical history, past social history, past surgical history and problem list.    Review of Systems   Constitutional:  Negative for activity change, appetite change, chills, fatigue, fever and unexpected weight change.   HENT:  Positive for congestion. Negative for ear discharge, ear pain, rhinorrhea, sinus pressure, sinus pain, sneezing, sore throat, tinnitus and trouble swallowing.    Eyes:  Negative for pain, discharge, redness, itching and visual disturbance.   Respiratory:  Negative for cough, shortness of breath and wheezing.    Cardiovascular:  Negative for chest  "pain, palpitations and leg swelling.   Gastrointestinal:  Negative for nausea and vomiting.   Endocrine: Negative for cold intolerance, heat intolerance, polydipsia, polyphagia and polyuria.   Musculoskeletal:  Positive for arthralgias.   Skin:  Negative for color change and rash.   Neurological:  Positive for seizures (Treated by Neurologist). Negative for dizziness, tremors, speech difficulty, weakness, light-headedness and headaches.   Hematological:  Negative for adenopathy.   Psychiatric/Behavioral:  Negative for confusion and decreased concentration. The patient is not nervous/anxious.    All other systems reviewed and are negative.      Vital signs:  /80 (BP Location: Left arm, Patient Position: Sitting, Cuff Size: Adult)   Pulse 76   Temp 96.9 °F (36.1 °C) (Temporal)   Resp 14   Ht 177.8 cm (70\")   Wt 96.6 kg (213 lb)   SpO2 100%   BMI 30.56 kg/m²     Physical Exam  Vitals and nursing note reviewed.   Constitutional:       General: He is not in acute distress.     Appearance: He is well-developed.   HENT:      Head: Normocephalic.      Nose: Nose normal.      Mouth/Throat:      Mouth: Mucous membranes are moist.   Eyes:      General: No scleral icterus.        Right eye: No discharge.         Left eye: No discharge.      Conjunctiva/sclera: Conjunctivae normal.   Neck:      Thyroid: No thyromegaly.      Vascular: No JVD.   Cardiovascular:      Rate and Rhythm: Normal rate and regular rhythm.      Heart sounds: Normal heart sounds. No murmur heard.     No friction rub.   Pulmonary:      Effort: Pulmonary effort is normal. No respiratory distress.      Breath sounds: Normal breath sounds. No decreased breath sounds, wheezing, rhonchi or rales.   Abdominal:      General: Bowel sounds are normal. There is no distension.      Palpations: Abdomen is soft.      Tenderness: There is no abdominal tenderness. There is no guarding or rebound.   Musculoskeletal:         General: Tenderness (Generalized) " present.      Cervical back: Neck supple.      Right lower leg: No edema.      Left lower leg: No edema.   Lymphadenopathy:      Cervical: No cervical adenopathy.   Skin:     General: Skin is warm and dry.      Capillary Refill: Capillary refill takes less than 2 seconds.   Neurological:      Mental Status: He is alert and oriented to person, place, and time.      Gait: Gait is intact.   Psychiatric:         Mood and Affect: Mood and affect normal.         Speech: Speech normal.         Behavior: Behavior is cooperative.         Thought Content: Thought content normal.         BMI is >= 30 and <35. (Class 1 Obesity). The following options were offered after discussion;: Information on healthy weight added to patient's after visit summary.      Assessment & Plan     Diagnoses and all orders for this visit:    1. Essential hypertension (Primary)  Comments:  Continue lisinopril 10 mg. Home BP monitoring recommended  Orders:  -     Comprehensive Metabolic Panel  -     Lipid Panel  -     TSH  -     lisinopril (PRINIVIL,ZESTRIL) 10 MG tablet; Take 1 tablet by mouth Daily.  Dispense: 90 tablet; Refill: 1    2. Arthritis  Comments:  Continue conservative measures.  Responds well to Voltaren gel  Orders:  -     CBC & Differential  -     Uric Acid  -     Sedimentation Rate  -     C-reactive Protein  -     Diclofenac Sodium (VOLTAREN) 1 % gel gel; Apply 4 g topically to the appropriate area as directed 3 (Three) Times a Day.  Dispense: 150 g; Refill: 4    3. Vitamin D deficiency  Comments:  Vitamin D level checked.  Currently is taking vitamin D weekly  Orders:  -     Vitamin D,25-Hydroxy    4. Seizure  Comments:  Continue Phenytoin ER, Lamictal and Tegretol XR    5. Anxiety  Comments:  Stable    I spent 43 minutes caring for Alberto on this date of service. This time includes time spent by me in the following activities:preparing for the visit, reviewing tests, obtaining and/or reviewing a separately obtained history,  performing a medically appropriate examination and/or evaluation , counseling and educating the patient/family/caregiver, ordering medications, tests, or procedures, documenting information in the medical record, independently interpreting results and communicating that information with the patient/family/caregiver, and care coordination    Follow Up In May    Findings and recommendations discussed with Alberto.  Test results will be shared with him when available.  Lifestyle modifications reinforced including nutrition and activity recommendations.  He will follow up in May sooner if problems/concerns occur.  Alberto was given instructions and counseling regarding his condition or for health maintenance advice. Please see specific information pulled into the AVS if appropriate.      This document has been electronically signed by:

## 2024-11-07 DIAGNOSIS — E55.9 VITAMIN D DEFICIENCY: Primary | Chronic | ICD-10-CM

## 2024-11-07 LAB
25(OH)D3 SERPL-MCNC: 34 NG/ML (ref 30–100)
ALBUMIN SERPL-MCNC: 4.7 G/DL (ref 3.5–5.2)
ALBUMIN/GLOB SERPL: 1.5 G/DL
ALP SERPL-CCNC: 71 U/L (ref 39–117)
ALT SERPL W P-5'-P-CCNC: 32 U/L (ref 1–41)
ANION GAP SERPL CALCULATED.3IONS-SCNC: 8.4 MMOL/L (ref 5–15)
AST SERPL-CCNC: 22 U/L (ref 1–40)
BASOPHILS # BLD AUTO: 0.05 10*3/MM3 (ref 0–0.2)
BASOPHILS NFR BLD AUTO: 0.8 % (ref 0–1.5)
BILIRUB SERPL-MCNC: 0.3 MG/DL (ref 0–1.2)
BUN SERPL-MCNC: 14 MG/DL (ref 6–20)
BUN/CREAT SERPL: 14.3 (ref 7–25)
CALCIUM SPEC-SCNC: 9.3 MG/DL (ref 8.6–10.5)
CHLORIDE SERPL-SCNC: 103 MMOL/L (ref 98–107)
CHOLEST SERPL-MCNC: 189 MG/DL (ref 0–200)
CO2 SERPL-SCNC: 27.6 MMOL/L (ref 22–29)
CREAT SERPL-MCNC: 0.98 MG/DL (ref 0.76–1.27)
CRP SERPL-MCNC: 0.73 MG/DL (ref 0–0.5)
DEPRECATED RDW RBC AUTO: 38.9 FL (ref 37–54)
EGFRCR SERPLBLD CKD-EPI 2021: 96.3 ML/MIN/1.73
EOSINOPHIL # BLD AUTO: 0.17 10*3/MM3 (ref 0–0.4)
EOSINOPHIL NFR BLD AUTO: 2.7 % (ref 0.3–6.2)
ERYTHROCYTE [DISTWIDTH] IN BLOOD BY AUTOMATED COUNT: 12.6 % (ref 12.3–15.4)
ERYTHROCYTE [SEDIMENTATION RATE] IN BLOOD: 10 MM/HR (ref 0–15)
GLOBULIN UR ELPH-MCNC: 3.2 GM/DL
GLUCOSE SERPL-MCNC: 88 MG/DL (ref 65–99)
HCT VFR BLD AUTO: 43.6 % (ref 37.5–51)
HDLC SERPL-MCNC: 46 MG/DL (ref 40–60)
HGB BLD-MCNC: 15 G/DL (ref 13–17.7)
IMM GRANULOCYTES # BLD AUTO: 0.03 10*3/MM3 (ref 0–0.05)
IMM GRANULOCYTES NFR BLD AUTO: 0.5 % (ref 0–0.5)
LDLC SERPL CALC-MCNC: 122 MG/DL (ref 0–100)
LDLC/HDLC SERPL: 2.61 {RATIO}
LYMPHOCYTES # BLD AUTO: 1.72 10*3/MM3 (ref 0.7–3.1)
LYMPHOCYTES NFR BLD AUTO: 26.8 % (ref 19.6–45.3)
MCH RBC QN AUTO: 29.4 PG (ref 26.6–33)
MCHC RBC AUTO-ENTMCNC: 34.4 G/DL (ref 31.5–35.7)
MCV RBC AUTO: 85.5 FL (ref 79–97)
MONOCYTES # BLD AUTO: 0.41 10*3/MM3 (ref 0.1–0.9)
MONOCYTES NFR BLD AUTO: 6.4 % (ref 5–12)
NEUTROPHILS NFR BLD AUTO: 4.03 10*3/MM3 (ref 1.7–7)
NEUTROPHILS NFR BLD AUTO: 62.8 % (ref 42.7–76)
NRBC BLD AUTO-RTO: 0 /100 WBC (ref 0–0.2)
PLATELET # BLD AUTO: 231 10*3/MM3 (ref 140–450)
PMV BLD AUTO: 12 FL (ref 6–12)
POTASSIUM SERPL-SCNC: 3.9 MMOL/L (ref 3.5–5.2)
PROT SERPL-MCNC: 7.9 G/DL (ref 6–8.5)
RBC # BLD AUTO: 5.1 10*6/MM3 (ref 4.14–5.8)
SODIUM SERPL-SCNC: 139 MMOL/L (ref 136–145)
TRIGL SERPL-MCNC: 114 MG/DL (ref 0–150)
TSH SERPL DL<=0.05 MIU/L-ACNC: 1.47 UIU/ML (ref 0.27–4.2)
URATE SERPL-MCNC: 4.6 MG/DL (ref 3.4–7)
VLDLC SERPL-MCNC: 21 MG/DL (ref 5–40)
WBC NRBC COR # BLD AUTO: 6.41 10*3/MM3 (ref 3.4–10.8)

## 2024-11-07 RX ORDER — ERGOCALCIFEROL 1.25 MG/1
50000 CAPSULE, LIQUID FILLED ORAL WEEKLY
Qty: 12 CAPSULE | Refills: 1 | Status: SHIPPED | OUTPATIENT
Start: 2024-11-07

## 2025-03-05 DIAGNOSIS — M19.90 ARTHRITIS: Chronic | ICD-10-CM

## 2025-04-20 DIAGNOSIS — E55.9 VITAMIN D DEFICIENCY: Chronic | ICD-10-CM

## 2025-04-21 RX ORDER — ERGOCALCIFEROL 1.25 MG/1
50000 CAPSULE, LIQUID FILLED ORAL WEEKLY
Qty: 5 CAPSULE | Refills: 0 | Status: SHIPPED | OUTPATIENT
Start: 2025-04-21

## 2025-04-28 ENCOUNTER — TELEPHONE (OUTPATIENT)
Dept: FAMILY MEDICINE CLINIC | Facility: CLINIC | Age: 45
End: 2025-04-28
Payer: COMMERCIAL

## 2025-04-28 NOTE — TELEPHONE ENCOUNTER
Hub ok to relay and schedule:    We received medication refill request and must have an follow up appt scheduled.    I called and spoke to significant other and she will relay a message have him call back to schedule

## 2025-08-04 DIAGNOSIS — M19.90 ARTHRITIS: Chronic | ICD-10-CM
